# Patient Record
Sex: MALE | Race: OTHER | HISPANIC OR LATINO | Employment: UNEMPLOYED | URBAN - METROPOLITAN AREA
[De-identification: names, ages, dates, MRNs, and addresses within clinical notes are randomized per-mention and may not be internally consistent; named-entity substitution may affect disease eponyms.]

---

## 2023-08-27 ENCOUNTER — HOSPITAL ENCOUNTER (EMERGENCY)
Facility: HOSPITAL | Age: 43
Discharge: HOME | End: 2023-08-27
Attending: EMERGENCY MEDICINE
Payer: COMMERCIAL

## 2023-08-27 VITALS
WEIGHT: 172.18 LBS | DIASTOLIC BLOOD PRESSURE: 71 MMHG | HEIGHT: 66 IN | OXYGEN SATURATION: 99 % | TEMPERATURE: 97.7 F | SYSTOLIC BLOOD PRESSURE: 115 MMHG | HEART RATE: 68 BPM | RESPIRATION RATE: 18 BRPM | BODY MASS INDEX: 27.67 KG/M2

## 2023-08-27 DIAGNOSIS — K29.70 GASTRITIS: ICD-10-CM

## 2023-08-27 DIAGNOSIS — K80.20 CHOLELITHIASIS: Primary | ICD-10-CM

## 2023-08-27 LAB
ALBUMIN SERPL BCP-MCNC: 4.3 G/DL (ref 3.5–5)
ALP SERPL-CCNC: 69 U/L (ref 34–104)
ALT SERPL W P-5'-P-CCNC: 35 U/L (ref 7–52)
ANION GAP SERPL CALCULATED.3IONS-SCNC: 4 MMOL/L
AST SERPL W P-5'-P-CCNC: 31 U/L (ref 13–39)
BASOPHILS # BLD AUTO: 0.03 THOUSANDS/ÂΜL (ref 0–0.1)
BASOPHILS NFR BLD AUTO: 0 % (ref 0–1)
BILIRUB SERPL-MCNC: 0.44 MG/DL (ref 0.2–1)
BUN SERPL-MCNC: 24 MG/DL (ref 5–25)
CALCIUM SERPL-MCNC: 9 MG/DL (ref 8.4–10.2)
CHLORIDE SERPL-SCNC: 107 MMOL/L (ref 96–108)
CO2 SERPL-SCNC: 26 MMOL/L (ref 21–32)
CREAT SERPL-MCNC: 0.81 MG/DL (ref 0.6–1.3)
EOSINOPHIL # BLD AUTO: 0.12 THOUSAND/ÂΜL (ref 0–0.61)
EOSINOPHIL NFR BLD AUTO: 2 % (ref 0–6)
ERYTHROCYTE [DISTWIDTH] IN BLOOD BY AUTOMATED COUNT: 13 % (ref 11.6–15.1)
GFR SERPL CREATININE-BSD FRML MDRD: 108 ML/MIN/1.73SQ M
GLUCOSE SERPL-MCNC: 110 MG/DL (ref 65–140)
HCT VFR BLD AUTO: 42.4 % (ref 36.5–49.3)
HGB BLD-MCNC: 14.4 G/DL (ref 12–17)
IMM GRANULOCYTES # BLD AUTO: 0.01 THOUSAND/UL (ref 0–0.2)
IMM GRANULOCYTES NFR BLD AUTO: 0 % (ref 0–2)
LIPASE SERPL-CCNC: 21 U/L (ref 11–82)
LYMPHOCYTES # BLD AUTO: 1.26 THOUSANDS/ÂΜL (ref 0.6–4.47)
LYMPHOCYTES NFR BLD AUTO: 16 % (ref 14–44)
MCH RBC QN AUTO: 29.8 PG (ref 26.8–34.3)
MCHC RBC AUTO-ENTMCNC: 34 G/DL (ref 31.4–37.4)
MCV RBC AUTO: 88 FL (ref 82–98)
MONOCYTES # BLD AUTO: 0.72 THOUSAND/ÂΜL (ref 0.17–1.22)
MONOCYTES NFR BLD AUTO: 9 % (ref 4–12)
NEUTROPHILS # BLD AUTO: 5.89 THOUSANDS/ÂΜL (ref 1.85–7.62)
NEUTS SEG NFR BLD AUTO: 73 % (ref 43–75)
NRBC BLD AUTO-RTO: 0 /100 WBCS
PLATELET # BLD AUTO: 189 THOUSANDS/UL (ref 149–390)
PMV BLD AUTO: 8.7 FL (ref 8.9–12.7)
POTASSIUM SERPL-SCNC: 3.8 MMOL/L (ref 3.5–5.3)
PROT SERPL-MCNC: 7 G/DL (ref 6.4–8.4)
RBC # BLD AUTO: 4.84 MILLION/UL (ref 3.88–5.62)
SODIUM SERPL-SCNC: 137 MMOL/L (ref 135–147)
WBC # BLD AUTO: 8.03 THOUSAND/UL (ref 4.31–10.16)

## 2023-08-27 PROCEDURE — 99283 EMERGENCY DEPT VISIT LOW MDM: CPT

## 2023-08-27 PROCEDURE — 85025 COMPLETE CBC W/AUTO DIFF WBC: CPT | Performed by: EMERGENCY MEDICINE

## 2023-08-27 PROCEDURE — 99284 EMERGENCY DEPT VISIT MOD MDM: CPT | Performed by: EMERGENCY MEDICINE

## 2023-08-27 PROCEDURE — 80053 COMPREHEN METABOLIC PANEL: CPT | Performed by: EMERGENCY MEDICINE

## 2023-08-27 PROCEDURE — 86677 HELICOBACTER PYLORI ANTIBODY: CPT | Performed by: EMERGENCY MEDICINE

## 2023-08-27 PROCEDURE — 83690 ASSAY OF LIPASE: CPT | Performed by: EMERGENCY MEDICINE

## 2023-08-27 PROCEDURE — 36415 COLL VENOUS BLD VENIPUNCTURE: CPT | Performed by: EMERGENCY MEDICINE

## 2023-08-27 RX ORDER — OMEPRAZOLE 20 MG/1
20 CAPSULE, DELAYED RELEASE ORAL DAILY
Qty: 30 CAPSULE | Refills: 11 | Status: SHIPPED | OUTPATIENT
Start: 2023-08-27 | End: 2023-08-28

## 2023-08-27 RX ORDER — FAMOTIDINE 20 MG/1
40 TABLET, FILM COATED ORAL ONCE
Status: COMPLETED | OUTPATIENT
Start: 2023-08-27 | End: 2023-08-27

## 2023-08-27 RX ORDER — KETOROLAC TROMETHAMINE 10 MG/1
10 TABLET, FILM COATED ORAL ONCE
Status: COMPLETED | OUTPATIENT
Start: 2023-08-27 | End: 2023-08-27

## 2023-08-27 RX ORDER — KETOROLAC TROMETHAMINE 10 MG/1
10 TABLET, FILM COATED ORAL EVERY 6 HOURS PRN
Qty: 15 TABLET | Refills: 0 | Status: SHIPPED | OUTPATIENT
Start: 2023-08-27 | End: 2023-08-30

## 2023-08-27 RX ADMIN — FAMOTIDINE 40 MG: 20 TABLET ORAL at 17:16

## 2023-08-27 RX ADMIN — KETOROLAC TROMETHAMINE 10 MG: 10 TABLET, FILM COATED ORAL at 17:16

## 2023-08-27 NOTE — ED PROVIDER NOTES
History  Chief Complaint   Patient presents with   • Abdominal Pain     C/o upper abd pain, that gets worse after eating for about a month. This is a 38y.o.  year old male  Without significant past medical history,   No prior surgeries  Denies the use of cigarettes, alcohol, or drugs  That presents to the Emergency Department today with a chief complaint of epigastric abdominal discomfort. He has been going on and off. For the last few weeks. Usually aggravated with meals especially sauces. Currently no pain. No nausea no vomiting. He has never had any abdominal surgery. History obtained directly from the patient. History provided by:  Patient   used: No    Abdominal Pain  Pain location:  Epigastric  Associated symptoms: no chest pain, no chills, no cough, no dysuria, no fever, no hematuria, no nausea, no shortness of breath, no sore throat and no vomiting        None       History reviewed. No pertinent past medical history. History reviewed. No pertinent surgical history. History reviewed. No pertinent family history. I have reviewed and agree with the history as documented. E-Cigarette/Vaping     E-Cigarette/Vaping Substances     Social History     Tobacco Use   • Smoking status: Never   • Smokeless tobacco: Never   Substance Use Topics   • Alcohol use: Never   • Drug use: Never       Review of Systems   Constitutional: Negative for chills and fever. HENT: Negative for ear pain and sore throat. Eyes: Negative for pain and visual disturbance. Respiratory: Negative for cough and shortness of breath. Cardiovascular: Negative for chest pain and palpitations. Gastrointestinal: Positive for abdominal pain. Negative for nausea and vomiting. Genitourinary: Negative for dysuria and hematuria. Musculoskeletal: Negative for arthralgias and back pain. Skin: Negative for color change and rash. Neurological: Negative for seizures and syncope.    All other systems reviewed and are negative. Physical Exam  Physical Exam  Vitals and nursing note reviewed. Constitutional:       General: He is not in acute distress. Appearance: He is well-developed and normal weight. HENT:      Head: Normocephalic and atraumatic. Eyes:      Conjunctiva/sclera: Conjunctivae normal.   Cardiovascular:      Rate and Rhythm: Normal rate and regular rhythm. Heart sounds: Normal heart sounds. No murmur heard. Pulmonary:      Effort: Pulmonary effort is normal. No respiratory distress. Breath sounds: Normal breath sounds. Abdominal:      General: Abdomen is flat. Bowel sounds are normal. There is no distension. There are no signs of injury. Palpations: Abdomen is soft. Tenderness: There is abdominal tenderness in the epigastric area. There is no guarding or rebound. Hernia: No hernia is present. Comments: ED bedside ultrasound was done by me. Gallbladder shows multiple stones. Note no pericholecystic fluid. He did have sonographic Smith's on palpation of the right upper quadrant. Musculoskeletal:         General: No swelling. Cervical back: Neck supple. Skin:     General: Skin is warm and dry. Capillary Refill: Capillary refill takes less than 2 seconds. Neurological:      General: No focal deficit present. Mental Status: He is alert and oriented to person, place, and time.    Psychiatric:         Mood and Affect: Mood normal.         Behavior: Behavior normal.         Vital Signs  ED Triage Vitals [08/27/23 1557]   Temperature Pulse Respirations Blood Pressure SpO2   97.7 °F (36.5 °C) 68 18 115/71 99 %      Temp Source Heart Rate Source Patient Position - Orthostatic VS BP Location FiO2 (%)   Tympanic Monitor Sitting Left arm --      Pain Score       --           Vitals:    08/27/23 1557   BP: 115/71   Pulse: 68   Patient Position - Orthostatic VS: Sitting         Visual Acuity      ED Medications  Medications - No data to display    Diagnostic Studies  Results Reviewed     Procedure Component Value Units Date/Time    Lipase [[id number]]  (Normal) Collected: 08/27/23 1626    Lab Status: Final result Specimen: Blood from Arm, Right Updated: 08/27/23 1703     Lipase 21 u/L     CMP [[id number]] Collected: 08/27/23 1626    Lab Status: Final result Specimen: Blood from Arm, Right Updated: 08/27/23 1703     Sodium 137 mmol/L      Potassium 3.8 mmol/L      Chloride 107 mmol/L      CO2 26 mmol/L      ANION GAP 4 mmol/L      BUN 24 mg/dL      Creatinine 0.81 mg/dL      Glucose 110 mg/dL      Calcium 9.0 mg/dL      AST 31 U/L      ALT 35 U/L      Alkaline Phosphatase 69 U/L      Total Protein 7.0 g/dL      Albumin 4.3 g/dL      Total Bilirubin 0.44 mg/dL      eGFR 108 ml/min/1.73sq m     Narrative:      [employer] guidelines for Chronic Kidney Disease (CKD):   •  Stage 1 with normal or high GFR (GFR > 90 mL/min/1.73 square meters)  •  Stage 2 Mild CKD (GFR = 60-89 mL/min/1.73 square meters)  •  Stage 3A Moderate CKD (GFR = 45-59 mL/min/1.73 square meters)  •  Stage 3B Moderate CKD (GFR = 30-44 mL/min/1.73 square meters)  •  Stage 4 Severe CKD (GFR = 15-29 mL/min/1.73 square meters)  •  Stage 5 End Stage CKD (GFR <15 mL/min/1.73 square meters)  Note: GFR calculation is accurate only with a steady state creatinine    CBC and differential [[id number]]  (Abnormal) Collected: 08/27/23 1626    Lab Status: Final result Specimen: Blood from Arm, Right Updated: 08/27/23 1634     WBC 8.03 Thousand/uL      RBC 4.84 Million/uL      Hemoglobin 14.4 g/dL      Hematocrit 42.4 %      MCV 88 fL      MCH 29.8 pg      MCHC 34.0 g/dL      RDW 13.0 %      MPV 8.7 fL      Platelets 675 Thousands/uL      nRBC 0 /100 WBCs      Neutrophils Relative 73 %      Immat GRANS % 0 %      Lymphocytes Relative 16 %      Monocytes Relative 9 %      Eosinophils Relative 2 %      Basophils Relative 0 %      Neutrophils Absolute 5.89 Thousands/µL      Immature Grans Absolute 0.01 Thousand/uL      Lymphocytes Absolute 1.26 Thousands/µL      Monocytes Absolute 0.72 Thousand/µL      Eosinophils Absolute 0.12 Thousand/µL      Basophils Absolute 0.03 Thousands/µL     Helicobacter Pylori,IgM [[id number]] Collected: 08/27/23 1626    Lab Status: In process Specimen: Arm, Right Updated: 08/27/23 1630                  right upper quadrant    (Results Pending)              Procedures  Procedures         ED Course  ED Course as of 08/27/23 1707   Sun Aug 27, 2023   1648 WBC: 8.03   1648 Hemoglobin: 14.4   1648 HCT: 42.4   1704 Lipase: 21   1704 Sodium: 137   1704 Potassium: 3.8   1704 BUN: 24   1704 Creatinine: 0.81                                             Medical Decision Making  I have considered a broad diagnosis for abdominal pain that includes: Appendicitis, diverticulitis, colitis, cholecystitis, biliary colic, volvulus, small bowel obstruction, ileus, UTI, and other abdominal pathology. Given the patient's physical exam and work-up today, there is low although not zero suspicion for these diagnoses. Patient was advised and given appropriate return precautions, including continued or new fever, persistent vomiting, worsening abdominal pain, or any other concerning signs or symptoms especially if there are new. Laboratory results revealed a   WBC   Normal  HH   Normal  PLT   Normal  Kidney Function  Normal  Electrolytes  Normal         Amount and/or Complexity of Data Reviewed  Labs: ordered. Decision-making details documented in ED Course. Risk  OTC drugs.           Disposition  Final diagnoses:   Cholelithiasis   Gastritis     Time reflects when diagnosis was documented in both MDM as applicable and the Disposition within this note     Time User Action Codes Description Comment    8/27/2023  5:05 PM Kp Hinton [K80.20] Cholelithiasis     8/27/2023  5:05 PM Kp Hinton [K29.70] Gastritis       ED Disposition     ED Disposition   Discharge    Condition Stable    Date/Time   Sun Aug 27, 2023  5:05 PM    Comment   Rebecca Shape discharge to home/self care. Follow-up Information     Follow up With Specialties Details Why Contact Info Additional Information     [facility] General Surgery In 1 week  [address]  [address] [address]  [address] [place] [address], [address], [place] [address] [phone]          Patient's Medications   Discharge Prescriptions    KETOROLAC (TORADOL) 10 MG TABLET    Take 1 tablet (10 mg total) by mouth every 6 (six) hours as needed for moderate pain       Start Date: 8/27/2023 End Date: --       Order Dose: 10 mg       Quantity: 15 tablet    Refills: 0    OMEPRAZOLE (PRILOSEC) 20 MG DELAYED RELEASE CAPSULE    Take 1 capsule (20 mg total) by mouth daily       Start Date: 8/27/2023 End Date: --       Order Dose: 20 mg       Quantity: 30 capsule    Refills: 11       Outpatient Discharge Orders    right upper quadrant   Standing Status: Future Standing Exp.  Date: 08/27/27       PDMP Review     None          ED Provider  Electronically Signed by           Cindy Greenwood MD  08/27/23 9189

## 2023-08-27 NOTE — DISCHARGE INSTRUCTIONS
A  personal message from Dr. Tangela Feliciano,  Thank you so much for allowing me to care for you today. I pride myself in the care and attention I give all my patients. I hope you were a witness to this tonight. If for any reason your condition does not improve or worsens, or you have a question that was not answered during your visit you can feel free to text me on my personal phone #  # [phone]. I will answer to your message and continue your care past your emergency room visit. Please understand that although you are being discharged because your condition has been deemed stable and able to be managed on an outpatient setting. However your condition may worsen as part of the natural progression of the illness/condition, if this occurs please come back to the emergency department for a repeat evaluation.

## 2023-08-28 ENCOUNTER — APPOINTMENT (EMERGENCY)
Dept: CT IMAGING | Facility: HOSPITAL | Age: 43
End: 2023-08-28
Payer: COMMERCIAL

## 2023-08-28 ENCOUNTER — HOSPITAL ENCOUNTER (EMERGENCY)
Facility: HOSPITAL | Age: 43
Discharge: HOME | End: 2023-08-28
Attending: EMERGENCY MEDICINE | Admitting: EMERGENCY MEDICINE
Payer: COMMERCIAL

## 2023-08-28 VITALS
HEART RATE: 73 BPM | OXYGEN SATURATION: 100 % | BODY MASS INDEX: 28.61 KG/M2 | DIASTOLIC BLOOD PRESSURE: 75 MMHG | TEMPERATURE: 98 F | WEIGHT: 178 LBS | HEIGHT: 66 IN | RESPIRATION RATE: 18 BRPM | SYSTOLIC BLOOD PRESSURE: 126 MMHG

## 2023-08-28 DIAGNOSIS — R10.9 ABDOMINAL PAIN: Primary | ICD-10-CM

## 2023-08-28 LAB
ALBUMIN SERPL BCP-MCNC: 4.2 G/DL (ref 3.5–5)
ALP SERPL-CCNC: 66 U/L (ref 34–104)
ALT SERPL W P-5'-P-CCNC: 32 U/L (ref 7–52)
ANION GAP SERPL CALCULATED.3IONS-SCNC: 6 MMOL/L
AST SERPL W P-5'-P-CCNC: 27 U/L (ref 13–39)
BACTERIA UR QL AUTO: ABNORMAL /HPF
BASOPHILS # BLD AUTO: 0.03 THOUSANDS/ÂΜL (ref 0–0.1)
BASOPHILS NFR BLD AUTO: 0 % (ref 0–1)
BILIRUB SERPL-MCNC: 0.53 MG/DL (ref 0.2–1)
BILIRUB UR QL STRIP: NEGATIVE
BUN SERPL-MCNC: 24 MG/DL (ref 5–25)
CALCIUM SERPL-MCNC: 9.1 MG/DL (ref 8.4–10.2)
CARDIAC TROPONIN I PNL SERPL HS: <2 NG/L
CHLORIDE SERPL-SCNC: 107 MMOL/L (ref 96–108)
CLARITY UR: CLEAR
CO2 SERPL-SCNC: 24 MMOL/L (ref 21–32)
COLOR UR: YELLOW
CREAT SERPL-MCNC: 0.78 MG/DL (ref 0.6–1.3)
EOSINOPHIL # BLD AUTO: 0.18 THOUSAND/ÂΜL (ref 0–0.61)
EOSINOPHIL NFR BLD AUTO: 2 % (ref 0–6)
ERYTHROCYTE [DISTWIDTH] IN BLOOD BY AUTOMATED COUNT: 13 % (ref 11.6–15.1)
GFR SERPL CREATININE-BSD FRML MDRD: 109 ML/MIN/1.73SQ M
GLUCOSE SERPL-MCNC: 108 MG/DL (ref 65–140)
GLUCOSE UR STRIP-MCNC: NEGATIVE MG/DL
HCT VFR BLD AUTO: 43 % (ref 36.5–49.3)
HGB BLD-MCNC: 14.7 G/DL (ref 12–17)
HGB UR QL STRIP.AUTO: NEGATIVE
IMM GRANULOCYTES # BLD AUTO: 0.02 THOUSAND/UL (ref 0–0.2)
IMM GRANULOCYTES NFR BLD AUTO: 0 % (ref 0–2)
KETONES UR STRIP-MCNC: NEGATIVE MG/DL
LEUKOCYTE ESTERASE UR QL STRIP: NEGATIVE
LIPASE SERPL-CCNC: 28 U/L (ref 11–82)
LYMPHOCYTES # BLD AUTO: 1.42 THOUSANDS/ÂΜL (ref 0.6–4.47)
LYMPHOCYTES NFR BLD AUTO: 14 % (ref 14–44)
MCH RBC QN AUTO: 29.9 PG (ref 26.8–34.3)
MCHC RBC AUTO-ENTMCNC: 34.2 G/DL (ref 31.4–37.4)
MCV RBC AUTO: 88 FL (ref 82–98)
MONOCYTES # BLD AUTO: 1.02 THOUSAND/ÂΜL (ref 0.17–1.22)
MONOCYTES NFR BLD AUTO: 10 % (ref 4–12)
MUCOUS THREADS UR QL AUTO: ABNORMAL
NEUTROPHILS # BLD AUTO: 7.46 THOUSANDS/ÂΜL (ref 1.85–7.62)
NEUTS SEG NFR BLD AUTO: 74 % (ref 43–75)
NITRITE UR QL STRIP: NEGATIVE
NON-SQ EPI CELLS URNS QL MICRO: ABNORMAL /HPF
NRBC BLD AUTO-RTO: 0 /100 WBCS
PH UR STRIP.AUTO: 5.5 [PH]
PLATELET # BLD AUTO: 197 THOUSANDS/UL (ref 149–390)
PMV BLD AUTO: 8.7 FL (ref 8.9–12.7)
POTASSIUM SERPL-SCNC: 3.7 MMOL/L (ref 3.5–5.3)
PROT SERPL-MCNC: 6.9 G/DL (ref 6.4–8.4)
PROT UR STRIP-MCNC: ABNORMAL MG/DL
RBC # BLD AUTO: 4.91 MILLION/UL (ref 3.88–5.62)
RBC #/AREA URNS AUTO: ABNORMAL /HPF
SODIUM SERPL-SCNC: 137 MMOL/L (ref 135–147)
SP GR UR STRIP.AUTO: 1.03 (ref 1–1.03)
UROBILINOGEN UR STRIP-ACNC: <2 MG/DL
WBC # BLD AUTO: 10.13 THOUSAND/UL (ref 4.31–10.16)
WBC #/AREA URNS AUTO: ABNORMAL /HPF

## 2023-08-28 PROCEDURE — 36415 COLL VENOUS BLD VENIPUNCTURE: CPT | Performed by: EMERGENCY MEDICINE

## 2023-08-28 PROCEDURE — 85025 COMPLETE CBC W/AUTO DIFF WBC: CPT | Performed by: EMERGENCY MEDICINE

## 2023-08-28 PROCEDURE — 93005 ELECTROCARDIOGRAM TRACING: CPT

## 2023-08-28 PROCEDURE — 96374 THER/PROPH/DIAG INJ IV PUSH: CPT

## 2023-08-28 PROCEDURE — 81001 URINALYSIS AUTO W/SCOPE: CPT | Performed by: EMERGENCY MEDICINE

## 2023-08-28 PROCEDURE — 74177 CT ABD & PELVIS W/CONTRAST: CPT

## 2023-08-28 PROCEDURE — 80053 COMPREHEN METABOLIC PANEL: CPT | Performed by: EMERGENCY MEDICINE

## 2023-08-28 PROCEDURE — 83690 ASSAY OF LIPASE: CPT | Performed by: EMERGENCY MEDICINE

## 2023-08-28 PROCEDURE — 99284 EMERGENCY DEPT VISIT MOD MDM: CPT

## 2023-08-28 PROCEDURE — 99285 EMERGENCY DEPT VISIT HI MDM: CPT | Performed by: EMERGENCY MEDICINE

## 2023-08-28 PROCEDURE — 84484 ASSAY OF TROPONIN QUANT: CPT | Performed by: EMERGENCY MEDICINE

## 2023-08-28 RX ORDER — PANTOPRAZOLE SODIUM 40 MG/1
40 TABLET, DELAYED RELEASE ORAL DAILY
Qty: 14 TABLET | Refills: 0 | Status: SHIPPED | OUTPATIENT
Start: 2023-08-28 | End: 2023-09-07

## 2023-08-28 RX ORDER — KETOROLAC TROMETHAMINE 30 MG/ML
15 INJECTION, SOLUTION INTRAMUSCULAR; INTRAVENOUS ONCE
Status: COMPLETED | OUTPATIENT
Start: 2023-08-28 | End: 2023-08-28

## 2023-08-28 RX ORDER — SUCRALFATE 1 G/1
1 TABLET ORAL 4 TIMES DAILY
Qty: 56 TABLET | Refills: 0 | Status: SHIPPED | OUTPATIENT
Start: 2023-08-28 | End: 2023-08-30

## 2023-08-28 RX ADMIN — IOHEXOL 100 ML: 350 INJECTION, SOLUTION INTRAVENOUS at 03:18

## 2023-08-28 RX ADMIN — KETOROLAC TROMETHAMINE 15 MG: 30 INJECTION, SOLUTION INTRAMUSCULAR at 02:56

## 2023-08-28 NOTE — ED PROVIDER NOTES
History  Chief Complaint   Patient presents with   • Abdominal Pain     Pt arrived ambulatory with c/o abdominal pain. Pt seen here yesterday for same, states he was not able to  the meds in the pharmacy     38 y.o. male presents with a chief complaint of persistent abdominal pain after evaluation in the emergency department yesterday the diagnosed cholelithiasis. Patient affirms 3 days of epigastric abdominal pain with occasional radiation to his left flank. Patient describes pain that came on gradually, has been waxing and waning throughout the day but worsening with supine positioning at night and continues in the emergency room. Patient states sitting forward somewhat improves his pain though supine positioning worsens it. Patient notes an episode approximately 1 month ago that was not as severe that resolved spontaneously but otherwise denies prior episodes. Patient denies any nausea or vomiting. Patient affirms diarrhea yesterday but none today. Patient notes last bowel movement was yesterday. Patient denies urinary symptoms. Patient denies testicular pain or penile discharge. Patient denies contacts with similar symptoms. Patient denies any recent use of antibiotics, international travel, or trauma. Patient notes history of no prior surgery. Focused Objective Exam:  Abdomen:  Inspection of an abdomen without previous abdominal surgical incisions noted without erythema, rashes or ecchymosis noted. No abdominal pulsations noted. Normal bowel sounds with no bruit auscultated. Soft abdomen. Palpitation noted tenderness in the epigastrium and lesser in the lower quadrants; tenderness not directly over McBurney's point. No masses or pulsatile aorta noted on examination. No rebound or guarding noted on examination, non-peritoneal exam.    Back:  No rash or signs of herpes zoster. No CVA tenderness noted.    Skin:  No ecchymosis of the umbilicus (negative De Young's sign) or flank (negative Abdi Rhodes's sign). Warm and dry. Medical Decision Making    Abdominal pain with a broad differential.  Will establish IV access and make patient NPO considering possibility of surgical intervention required. Will initiate symptomatic management. As patient has history of risk factors for ACS, will obtain EKG and troponin to evaluate for potential referred pain. Review of the medical record including prior ED note. Differential is broad and includes significant likelihood of intra-abdominal pathology, including concerns for potential epigastric or biliary pathology. Patient does have some tenderness in the right lower quadrant though it is not directly over McBurney's point, lesser suspicion for appendicitis. Considering associated diarrhea yesterday, potentially infectious or inflammatory processes. Patient did note improvement after treatment yesterday with ketorolac so we will repeat this while obtaining further evaluation. Will obtain CBC to evaluate for anemia and leukocytosis. Will obtain CMP to evaluate electrolytes, renal, biliary, and hepatic function. Will obtain lipase to evaluate for potential pancreatitis. Will obtain urinalysis to evaluate for possible urinary infectious sources and ketones to evaluate potential hydration state. Based on patient's age, history, physical exam and presenting complaint, will obtain contrast enhanced CT imaging of patient's abdomen and pelvis to further evaluate for possible intraabdominal pathology. Prior to Admission Medications   Prescriptions Last Dose Informant Patient Reported? Taking?   ketorolac (TORADOL) 10 mg tablet   No No   Sig: Take 1 tablet (10 mg total) by mouth every 6 (six) hours as needed for moderate pain      Facility-Administered Medications: None       History reviewed. No pertinent past medical history. History reviewed. No pertinent surgical history. History reviewed.  No pertinent family history. I have reviewed and agree with the history as documented.     E-Cigarette/Vaping     E-Cigarette/Vaping Substances     Social History     Tobacco Use   • Smoking status: Never   • Smokeless tobacco: Never   Substance Use Topics   • Alcohol use: Never   • Drug use: Never       Review of Systems    Physical Exam  Physical Exam    Vital Signs  ED Triage Vitals [08/28/23 0218]   Temperature Pulse Respirations Blood Pressure SpO2   98 °F (36.7 °C) 73 18 126/75 100 %      Temp Source Heart Rate Source Patient Position - Orthostatic VS BP Location FiO2 (%)   Tympanic Monitor Sitting Left arm --      Pain Score       --           Vitals:    08/28/23 0218   BP: 126/75   Pulse: 73   Patient Position - Orthostatic VS: Sitting         Visual Acuity      ED Medications  Medications   ketorolac (TORADOL) injection 15 mg (15 mg Intravenous Given 8/28/23 0256)   iohexol (OMNIPAQUE) 350 MG/ML injection (SINGLE-DOSE) 100 mL (100 mL Intravenous Given 8/28/23 0318)       Diagnostic Studies  Results Reviewed     Procedure Component Value Units Date/Time    HS Troponin I 4hr [[id number]]     Lab Status: No result Specimen: Blood     HS Troponin 0hr (reflex protocol) [[id number]]  (Normal) Collected: 08/28/23 0303    Lab Status: Final result Specimen: Blood from Arm, Left Updated: 08/28/23 0333     hs TnI 0hr <2 ng/L     HS Troponin I 2hr [[id number]]     Lab Status: No result Specimen: Blood     Urine Microscopic [[id number]]  (Abnormal) Collected: 08/28/23 0303    Lab Status: Final result Specimen: Urine, Clean Catch Updated: 08/28/23 0312     RBC, UA None Seen /hpf      WBC, UA 1-2 /hpf      Epithelial Cells Occasional /hpf      Bacteria, UA None Seen /hpf      MUCUS THREADS Occasional    UA w Reflex to Microscopic w Reflex to Culture [[id number]]  (Abnormal) Collected: 08/28/23 0303    Lab Status: Final result Specimen: Urine, Clean Catch Updated: 08/28/23 0311     Color, UA Yellow     Clarity, UA Clear     Specific Gravity, UA 1.035     pH, UA 5.5     Leukocytes, UA Negative     Nitrite, UA Negative     Protein, UA Trace mg/dl      Glucose, UA Negative mg/dl      Ketones, UA Negative mg/dl      Urobilinogen, UA <2.0 mg/dl      Bilirubin, UA Negative     Occult Blood, UA Negative    CMP [[id number]] Collected: 08/28/23 0234    Lab Status: Final result Specimen: Blood from Arm, Left Updated: 08/28/23 0258     Sodium 137 mmol/L      Potassium 3.7 mmol/L      Chloride 107 mmol/L      CO2 24 mmol/L      ANION GAP 6 mmol/L      BUN 24 mg/dL      Creatinine 0.78 mg/dL      Glucose 108 mg/dL      Calcium 9.1 mg/dL      AST 27 U/L      ALT 32 U/L      Alkaline Phosphatase 66 U/L      Total Protein 6.9 g/dL      Albumin 4.2 g/dL      Total Bilirubin 0.53 mg/dL      eGFR 109 ml/min/1.73sq m     Narrative:      [facility] guidelines for Chronic Kidney Disease (CKD):   •  Stage 1 with normal or high GFR (GFR > 90 mL/min/1.73 square meters)  •  Stage 2 Mild CKD (GFR = 60-89 mL/min/1.73 square meters)  •  Stage 3A Moderate CKD (GFR = 45-59 mL/min/1.73 square meters)  •  Stage 3B Moderate CKD (GFR = 30-44 mL/min/1.73 square meters)  •  Stage 4 Severe CKD (GFR = 15-29 mL/min/1.73 square meters)  •  Stage 5 End Stage CKD (GFR <15 mL/min/1.73 square meters)  Note: GFR calculation is accurate only with a steady state creatinine    Lipase [[id number]]  (Normal) Collected: 08/28/23 0234    Lab Status: Final result Specimen: Blood from Arm, Left Updated: 08/28/23 0258     Lipase 28 u/L     CBC and differential [[id number]]  (Abnormal) Collected: 08/28/23 0234    Lab Status: Final result Specimen: Blood from Arm, Left Updated: 08/28/23 0240     WBC 10.13 Thousand/uL      RBC 4.91 Million/uL      Hemoglobin 14.7 g/dL      Hematocrit 43.0 %      MCV 88 fL      MCH 29.9 pg      MCHC 34.2 g/dL      RDW 13.0 %      MPV 8.7 fL      Platelets 312 Thousands/uL      nRBC 0 /100 WBCs      Neutrophils Relative 74 %      Immat GRANS % 0 % Lymphocytes Relative 14 %      Monocytes Relative 10 %      Eosinophils Relative 2 %      Basophils Relative 0 %      Neutrophils Absolute 7.46 Thousands/µL      Immature Grans Absolute 0.02 Thousand/uL      Lymphocytes Absolute 1.42 Thousands/µL      Monocytes Absolute 1.02 Thousand/µL      Eosinophils Absolute 0.18 Thousand/µL      Basophils Absolute 0.03 Thousands/µL                  CT abdomen pelvis with contrast   Final Result by Teresa Trinidad MD (08/28 9414)      No evidence of acute intra-abdominal or pelvic pathology            Workstation performed: [id number]                    Procedures  Procedures         ED Course  ED Course as of 08/28/23 0417   Mon Aug 28, 2023   0301 KG demonstrates normal sinus rhythm with no acute ST segment changes, nonspecific findings without prior to compare. Intervals are normal including QTc of 388.   0359 Patient's laboratory evaluation did not demonstrate any clear findings that would explain the etiology of his symptoms. Discussed diagnostic concerning for potential etiologies and suggested follow-up with gastroenterology for review of possible gastric or other pathologies. Patient notes improvement in symptoms following treatment. Will place patient on course of pantoprazole and Carafate and have follow closely with gastroenterology. Discussed and emphasized diagnostic concern and the need for continued follow-up and return precautions in detail. SBIRT 22yo+    Flowsheet Row Most Recent Value   Initial Alcohol Screen: US AUDIT-C     1. How often do you have a drink containing alcohol? 0 Filed at: 08/28/2023 0221   2. How many drinks containing alcohol do you have on a typical day you are drinking? 0 Filed at: 08/28/2023 0221   3a. Male UNDER 65: How often do you have five or more drinks on one occasion? 0 Filed at: 08/28/2023 0221   Audit-C Score 0 Filed at: 08/28/2023 0221   GAIL: How many times in the past year have you. .. Used an illegal drug or used a prescription medication for non-medical reasons? Never Filed at: 08/28/2023 0221                    MDM    Disposition  Final diagnoses:   Abdominal pain     Time reflects when diagnosis was documented in both MDM as applicable and the Disposition within this note     Time User Action Codes Description Comment    8/28/2023  4:00 AM Rocky Nguyen Add [R10.9] Abdominal pain     8/28/2023  4:00 AM Rocky Nguyen Modify [R10.9] Abdominal pain       ED Disposition     ED Disposition   Discharge    Condition   Stable    Date/Time   Mon Aug 28, 2023  4:00 AM    Comment   Lina Busby discharge to home/self care. Follow-up Information     Follow up With Specialties Details Why Contact Info Additional [address] [facility] Schedule an appointment as soon as possible for a visit  Schedule follow-up for continued management of your abdominal pain with gastroenterology.  [address] [facility] [place], [address] [facility], [facility], [place], [place], [address]     [address] [place] [address] Emergency Department Emergency Medicine Go to  If symptoms worsen [address] [facility] [id number] [address] [facility] Emergency Department, [place], [place], [address]          Discharge Medication List as of 8/28/2023  4:01 AM      START taking these medications    Details   pantoprazole (PROTONIX) 40 mg tablet Take 1 tablet (40 mg total) by mouth daily for 14 days, Starting Mon 8/28/2023, Until Mon 9/11/2023, Print      sucralfate (CARAFATE) 1 g tablet Take 1 tablet (1 g total) by mouth 4 (four) times a day for 14 days, Starting Mon 8/28/2023, Until Mon 9/11/2023, Print         CONTINUE these medications which have NOT CHANGED    Details   ketorolac (TORADOL) 10 mg tablet Take 1 tablet (10 mg total) by mouth every 6 (six) hours as needed for moderate pain, Starting Sun 8/27/2023, Print             No discharge procedures on file.     PDMP Review     None          ED Provider  Electronically Signed by           Adilene Stover MD  08/28/23 7317

## 2023-08-29 ENCOUNTER — HOSPITAL ENCOUNTER (OUTPATIENT)
Facility: HOSPITAL | Age: 43
Setting detail: OBSERVATION
Discharge: HOME | End: 2023-08-30
Attending: EMERGENCY MEDICINE | Admitting: FAMILY MEDICINE
Payer: COMMERCIAL

## 2023-08-29 ENCOUNTER — APPOINTMENT (EMERGENCY)
Dept: ULTRASOUND IMAGING | Facility: HOSPITAL | Age: 43
End: 2023-08-29
Payer: COMMERCIAL

## 2023-08-29 ENCOUNTER — APPOINTMENT (EMERGENCY)
Dept: CT IMAGING | Facility: HOSPITAL | Age: 43
End: 2023-08-29
Payer: COMMERCIAL

## 2023-08-29 DIAGNOSIS — R10.13 EPIGASTRIC PAIN: Primary | ICD-10-CM

## 2023-08-29 DIAGNOSIS — R93.5 ABNORMAL CT OF THE ABDOMEN: ICD-10-CM

## 2023-08-29 LAB
ALBUMIN SERPL BCP-MCNC: 4.1 G/DL (ref 3.5–5)
ALP SERPL-CCNC: 70 U/L (ref 34–104)
ALT SERPL W P-5'-P-CCNC: 29 U/L (ref 7–52)
ANION GAP SERPL CALCULATED.3IONS-SCNC: 4 MMOL/L
AST SERPL W P-5'-P-CCNC: 24 U/L (ref 13–39)
ATRIAL RATE: 61 BPM
ATRIAL RATE: 62 BPM
BASOPHILS # BLD AUTO: 0.03 THOUSANDS/ÂΜL (ref 0–0.1)
BASOPHILS NFR BLD AUTO: 0 % (ref 0–1)
BILIRUB SERPL-MCNC: 0.59 MG/DL (ref 0.2–1)
BUN SERPL-MCNC: 18 MG/DL (ref 5–25)
CALCIUM SERPL-MCNC: 9.2 MG/DL (ref 8.4–10.2)
CHLORIDE SERPL-SCNC: 105 MMOL/L (ref 96–108)
CO2 SERPL-SCNC: 28 MMOL/L (ref 21–32)
CREAT SERPL-MCNC: 0.87 MG/DL (ref 0.6–1.3)
CRP SERPL QL: 49.6 MG/L
EOSINOPHIL # BLD AUTO: 0.17 THOUSAND/ÂΜL (ref 0–0.61)
EOSINOPHIL NFR BLD AUTO: 2 % (ref 0–6)
ERYTHROCYTE [DISTWIDTH] IN BLOOD BY AUTOMATED COUNT: 13.1 % (ref 11.6–15.1)
GFR SERPL CREATININE-BSD FRML MDRD: 104 ML/MIN/1.73SQ M
GLUCOSE SERPL-MCNC: 100 MG/DL (ref 65–140)
H PYLORI IGM SER-ACNC: <9 UNITS (ref 0–8.9)
HCT VFR BLD AUTO: 42.4 % (ref 36.5–49.3)
HGB BLD-MCNC: 14.4 G/DL (ref 12–17)
IMM GRANULOCYTES # BLD AUTO: 0.01 THOUSAND/UL (ref 0–0.2)
IMM GRANULOCYTES NFR BLD AUTO: 0 % (ref 0–2)
LACTATE SERPL-SCNC: 0.7 MMOL/L (ref 0.5–2)
LIPASE SERPL-CCNC: 25 U/L (ref 11–82)
LYMPHOCYTES # BLD AUTO: 1.41 THOUSANDS/ÂΜL (ref 0.6–4.47)
LYMPHOCYTES NFR BLD AUTO: 17 % (ref 14–44)
MCH RBC QN AUTO: 29.9 PG (ref 26.8–34.3)
MCHC RBC AUTO-ENTMCNC: 34 G/DL (ref 31.4–37.4)
MCV RBC AUTO: 88 FL (ref 82–98)
MONOCYTES # BLD AUTO: 1 THOUSAND/ÂΜL (ref 0.17–1.22)
MONOCYTES NFR BLD AUTO: 12 % (ref 4–12)
NEUTROPHILS # BLD AUTO: 5.77 THOUSANDS/ÂΜL (ref 1.85–7.62)
NEUTS SEG NFR BLD AUTO: 69 % (ref 43–75)
NRBC BLD AUTO-RTO: 0 /100 WBCS
P AXIS: 41 DEGREES
P AXIS: 42 DEGREES
PLATELET # BLD AUTO: 190 THOUSANDS/UL (ref 149–390)
PMV BLD AUTO: 8.8 FL (ref 8.9–12.7)
POTASSIUM SERPL-SCNC: 4 MMOL/L (ref 3.5–5.3)
PR INTERVAL: 156 MS
PR INTERVAL: 164 MS
PROT SERPL-MCNC: 6.9 G/DL (ref 6.4–8.4)
QRS AXIS: 48 DEGREES
QRS AXIS: 61 DEGREES
QRSD INTERVAL: 100 MS
QRSD INTERVAL: 102 MS
QT INTERVAL: 386 MS
QT INTERVAL: 414 MS
QTC INTERVAL: 388 MS
QTC INTERVAL: 420 MS
RBC # BLD AUTO: 4.82 MILLION/UL (ref 3.88–5.62)
SODIUM SERPL-SCNC: 137 MMOL/L (ref 135–147)
T WAVE AXIS: 30 DEGREES
T WAVE AXIS: 51 DEGREES
VENTRICULAR RATE: 61 BPM
VENTRICULAR RATE: 62 BPM
WBC # BLD AUTO: 8.39 THOUSAND/UL (ref 4.31–10.16)

## 2023-08-29 PROCEDURE — 85025 COMPLETE CBC W/AUTO DIFF WBC: CPT

## 2023-08-29 PROCEDURE — 83605 ASSAY OF LACTIC ACID: CPT | Performed by: PHYSICIAN ASSISTANT

## 2023-08-29 PROCEDURE — 83993 ASSAY FOR CALPROTECTIN FECAL: CPT | Performed by: PHYSICIAN ASSISTANT

## 2023-08-29 PROCEDURE — 96374 THER/PROPH/DIAG INJ IV PUSH: CPT

## 2023-08-29 PROCEDURE — 86140 C-REACTIVE PROTEIN: CPT | Performed by: PHYSICIAN ASSISTANT

## 2023-08-29 PROCEDURE — 93005 ELECTROCARDIOGRAM TRACING: CPT

## 2023-08-29 PROCEDURE — 96361 HYDRATE IV INFUSION ADD-ON: CPT

## 2023-08-29 PROCEDURE — 99223 1ST HOSP IP/OBS HIGH 75: CPT | Performed by: FAMILY MEDICINE

## 2023-08-29 PROCEDURE — G1004 CDSM NDSC: HCPCS

## 2023-08-29 PROCEDURE — C9113 INJ PANTOPRAZOLE SODIUM, VIA: HCPCS | Performed by: FAMILY MEDICINE

## 2023-08-29 PROCEDURE — 76705 ECHO EXAM OF ABDOMEN: CPT

## 2023-08-29 PROCEDURE — 99284 EMERGENCY DEPT VISIT MOD MDM: CPT

## 2023-08-29 PROCEDURE — 96375 TX/PRO/DX INJ NEW DRUG ADDON: CPT

## 2023-08-29 PROCEDURE — 36415 COLL VENOUS BLD VENIPUNCTURE: CPT

## 2023-08-29 PROCEDURE — 80053 COMPREHEN METABOLIC PANEL: CPT

## 2023-08-29 PROCEDURE — 83690 ASSAY OF LIPASE: CPT

## 2023-08-29 PROCEDURE — 99214 OFFICE O/P EST MOD 30 MIN: CPT | Performed by: INTERNAL MEDICINE

## 2023-08-29 PROCEDURE — 93010 ELECTROCARDIOGRAM REPORT: CPT | Performed by: INTERNAL MEDICINE

## 2023-08-29 PROCEDURE — 99285 EMERGENCY DEPT VISIT HI MDM: CPT

## 2023-08-29 PROCEDURE — 74177 CT ABD & PELVIS W/CONTRAST: CPT

## 2023-08-29 RX ORDER — FAMOTIDINE 20 MG/1
20 TABLET, FILM COATED ORAL 2 TIMES DAILY
Qty: 30 TABLET | Refills: 0 | Status: SHIPPED | OUTPATIENT
Start: 2023-08-29

## 2023-08-29 RX ORDER — FAMOTIDINE 10 MG/ML
20 INJECTION, SOLUTION INTRAVENOUS ONCE
Status: COMPLETED | OUTPATIENT
Start: 2023-08-29 | End: 2023-08-29

## 2023-08-29 RX ORDER — SODIUM CHLORIDE, SODIUM GLUCONATE, SODIUM ACETATE, POTASSIUM CHLORIDE, MAGNESIUM CHLORIDE, SODIUM PHOSPHATE, DIBASIC, AND POTASSIUM PHOSPHATE .53; .5; .37; .037; .03; .012; .00082 G/100ML; G/100ML; G/100ML; G/100ML; G/100ML; G/100ML; G/100ML
100 INJECTION, SOLUTION INTRAVENOUS CONTINUOUS
Status: DISCONTINUED | OUTPATIENT
Start: 2023-08-29 | End: 2023-08-30 | Stop reason: HOSPADM

## 2023-08-29 RX ORDER — DICYCLOMINE HYDROCHLORIDE 10 MG/1
10 CAPSULE ORAL
Status: DISCONTINUED | OUTPATIENT
Start: 2023-08-29 | End: 2023-08-30 | Stop reason: HOSPADM

## 2023-08-29 RX ORDER — PANTOPRAZOLE SODIUM 40 MG/10ML
40 INJECTION, POWDER, LYOPHILIZED, FOR SOLUTION INTRAVENOUS EVERY 12 HOURS SCHEDULED
Status: DISCONTINUED | OUTPATIENT
Start: 2023-08-29 | End: 2023-08-30 | Stop reason: HOSPADM

## 2023-08-29 RX ORDER — ACETAMINOPHEN 325 MG/1
650 TABLET ORAL EVERY 6 HOURS PRN
Status: DISCONTINUED | OUTPATIENT
Start: 2023-08-29 | End: 2023-08-30 | Stop reason: HOSPADM

## 2023-08-29 RX ORDER — POLYETHYLENE GLYCOL 3350 17 G/17G
238 POWDER, FOR SOLUTION ORAL ONCE
Status: COMPLETED | OUTPATIENT
Start: 2023-08-29 | End: 2023-08-29

## 2023-08-29 RX ORDER — ONDANSETRON 2 MG/ML
4 INJECTION INTRAMUSCULAR; INTRAVENOUS EVERY 6 HOURS PRN
Status: DISCONTINUED | OUTPATIENT
Start: 2023-08-29 | End: 2023-08-30 | Stop reason: HOSPADM

## 2023-08-29 RX ORDER — HYDROMORPHONE HCL IN WATER/PF 6 MG/30 ML
0.2 PATIENT CONTROLLED ANALGESIA SYRINGE INTRAVENOUS ONCE
Status: COMPLETED | OUTPATIENT
Start: 2023-08-29 | End: 2023-08-29

## 2023-08-29 RX ADMIN — PANTOPRAZOLE SODIUM 40 MG: 40 INJECTION, POWDER, FOR SOLUTION INTRAVENOUS at 11:26

## 2023-08-29 RX ADMIN — HYDROMORPHONE HYDROCHLORIDE 0.2 MG: 0.2 INJECTION, SOLUTION INTRAMUSCULAR; INTRAVENOUS; SUBCUTANEOUS at 01:25

## 2023-08-29 RX ADMIN — POLYETHYLENE GLYCOL 3350 238 G: 17 POWDER, FOR SOLUTION ORAL at 17:35

## 2023-08-29 RX ADMIN — FAMOTIDINE 20 MG: 10 INJECTION, SOLUTION INTRAVENOUS at 04:03

## 2023-08-29 RX ADMIN — SODIUM CHLORIDE, SODIUM GLUCONATE, SODIUM ACETATE, POTASSIUM CHLORIDE, MAGNESIUM CHLORIDE, SODIUM PHOSPHATE, DIBASIC, AND POTASSIUM PHOSPHATE 100 ML/HR: .53; .5; .37; .037; .03; .012; .00082 INJECTION, SOLUTION INTRAVENOUS at 11:27

## 2023-08-29 RX ADMIN — PANTOPRAZOLE SODIUM 40 MG: 40 INJECTION, POWDER, FOR SOLUTION INTRAVENOUS at 21:20

## 2023-08-29 RX ADMIN — IOHEXOL 100 ML: 350 INJECTION, SOLUTION INTRAVENOUS at 01:39

## 2023-08-29 RX ADMIN — DICYCLOMINE HYDROCHLORIDE 10 MG: 10 CAPSULE ORAL at 17:35

## 2023-08-29 RX ADMIN — SODIUM CHLORIDE 1000 ML: 0.9 INJECTION, SOLUTION INTRAVENOUS at 01:25

## 2023-08-29 RX ADMIN — DICYCLOMINE HYDROCHLORIDE 10 MG: 10 CAPSULE ORAL at 21:20

## 2023-08-29 RX ADMIN — DICYCLOMINE HYDROCHLORIDE 10 MG: 10 CAPSULE ORAL at 11:26

## 2023-08-29 NOTE — CASE MANAGEMENT
Case Management Assessment & Discharge Planning Note    Patient name Greta Vuong  Location ED 14/ED 14 MRN [id number]  : 1980 Date 2023       Current Admission Date: 2023  Current Admission Diagnosis:Epigastric pain  Patient Active Problem List    Diagnosis Date Noted   • Epigastric pain 2023      LOS (days): 0  Geometric Mean LOS (GMLOS) (days):   Days to GMLOS:     OBJECTIVE:              Current admission status: Observation       Preferred Pharmacy:   PATIENT/FAMILY REPORTS NO PREFERRED PHARMACY  No address on file      Primary Care Provider: No primary care provider on file. Primary Insurance: [address] [place] [address]  Secondary Insurance:     ASSESSMENT:  Active Health Care Proxies    There are no active Health Care Proxies on file. Advance Directives  Does patient have a [address]?: No  Was patient offered paperwork?: Yes (not interested)  Does patient currently have a Health Care decision maker?: Yes, please see Health Care Proxy section  Does patient have Advance Directives?: No  Was patient offered paperwork?: Yes         Readmission Root Cause  30 Day Readmission: No    Patient Information  Admitted from[de-identified] Home  Mental Status: Alert  During Assessment patient was accompanied by: Spouse  Assessment information provided by[de-identified] Patient  Primary Caregiver: Self  Support Systems: Spouse/significant other  [facility]: [address] [place] [address] do you live in?: [address] [facility] entry access options. Select all that apply.: Stairs  Number of steps to enter home. : 1  Do the steps have railings?: No  Type of Current Residence: [address] [place] home  Upon entering residence, is there a bedroom on the main floor (no further steps)?: No  A bedroom is located on the following floor levels of residence (select all that apply):: 2nd Floor  Upon entering residence, is there a bathroom on the main floor (no further steps)?: Yes  Number of steps to 2nd floor from main floor: One Flight  In the last 12 months, was there a time when you were not able to pay the mortgage or rent on time?: No  In the last 12 months, how many places have you lived?: 1  In the last 12 months, was there a time when you did not have a steady place to sleep or slept in a shelter (including now)?: No  Homeless/housing insecurity resource given?: No  Living Arrangements: Lives w/ Spouse/significant other  Is patient a ?: No    Activities of Daily Living Prior to Admission  Functional Status: Independent  Completes ADLs independently?: Yes  Ambulates independently?: Yes  Does patient use assisted devices?: No  Does patient currently own DME?: No  Does patient have a history of Outpatient Therapy (PT/OT)?: No  Does the patient have a history of Short-Term Rehab?: No  Does patient have a history of HHC?: No  Does patient currently have [facility]?: No         Patient Information Continued  Income Source: Employed  Does patient have prescription coverage?: Yes  Within the past 12 months, you worried that your food would run out before you got the money to buy more.: Never true  Within the past 12 months, the food you bought just didn't last and you didn't have money to get more.: Never true  Food insecurity resource given?: No  Does patient receive dialysis treatments?: No  Does patient have a history of substance abuse?: No  Does patient have a history of Mental Health Diagnosis?: No         Means of Transportation  Means of Transport to Appts[de-identified] Drives Self  In the past 12 months, has lack of transportation kept you from medical appointments or from getting medications?: No  In the past 12 months, has lack of transportation kept you from meetings, work, or from getting things needed for daily living?: No  Was application for public transport provided?: No        DISCHARGE DETAILS:    Discharge planning discussed with[de-identified] patient  Freedom of Choice: Yes  Comments - Freedom of Choice: No Dc needs apparent  CM contacted family/caregiver?: No- see comments                  [facility]          Is the patient interested in [facility] at discharge?: No    DME Referral Provided  Referral made for DME?: No    Other Referral/Resources/Interventions Provided:  Referral Comments: No Dc needs apparent         Treatment Team Recommendation: Home  Discharge Destination Plan[de-identified] Home  Transport at Discharge : Automobile, Family

## 2023-08-29 NOTE — ASSESSMENT & PLAN NOTE
37 yo male who presents with 1 week history of epigastric pain radiating into his right lower back. No association with nausea vomiting blood in stool/black tarry stools. Pain has been fluctuating in intensity. Patient has presented to the ED 3 times for evaluation. · CT abdomen pelvis notes interval small bowel dilatation with long segment of small bowel mural thickening and enhancement potentially reflecting inflammatory bowel disease  · Right upper quadrant ultrasound negative  · LFTs within normal limits/lipase normal/UA negative  · CRP elevated at 49.6. · GI consulted.   Concern for underlying inflammatory bowel disease  · Started on as needed Tylenol/Bentyl/Dilaudid for pain control  · Keep patient n.p.o./IV fluids/IV Protonix

## 2023-08-29 NOTE — DISCHARGE INSTRUCTIONS
Follow-up with GI for further evaluation and treatment of epigastric pain. I provided you with a prescription for Pepcid. Take as directed. Pepcid helps to decrease gastric acid secretion. Schedule ultrasound of right upper quadrant to further characterize structures of right upper quadrant. Return to ED for new or worsening symptoms. Also recommend trialing Tums when pain is present. You can buy Tums at any grocery store or pharmacy. You can also try Mylanta. Mylanta is also available at the pharmacy and grocery store.      Phone number for [facility]:   [phone]

## 2023-08-29 NOTE — ED PROVIDER NOTES
History  Chief Complaint   Patient presents with   • Abdominal Pain     Pt arrives via EMS for abdominal pain. States was seen here yesterday and dx with gallstones. Pain became severe around 4pm. Took Toradol around 9pm with no relief. Denies n/v/d.      49-year-old male presents ED for evaluation of persistent epigastric abdominal pain. The past 2 nights he has visited the ED for the same complaint. He has had persistently benign labs the past 2 nights. 2 nights ago he had a ultrasound of his right upper quadrant performed in which he had suspected gallstones. Ultrasound was not performed by official ultrasound tech. He was given a referral to GI and had an outpatient ultrasound. Patient has not attended either the GI referral or outpatient ultrasound. He does have a scheduled appointment with GI on September 12, 2023. Last night he had a CT of abdomen and pelvis with IV contrast.  No cholecystitis or gallstones were seen. This evening patient took his prescribed medication of Toradol and Protonix. He states that at 9 PM he took Toradol and the pain reduced a little bit however it returned aggressively around midnight. This prompted patient to return to ED. He denies fever, chills, chest pain, shortness of breath, syncope, seizure, loss of motor function of extremity, loss of touch sensation of extremity. Denies eating anything abnormal today. Denies consuming alcohol today. Patient is frustrated by the persistence of the abdominal pain and lack of resolution of symptoms. Seeking to have more assessments done. Prior to Admission Medications   Prescriptions Last Dose Informant Patient Reported?  Taking?   ketorolac (TORADOL) 10 mg tablet   No No   Sig: Take 1 tablet (10 mg total) by mouth every 6 (six) hours as needed for moderate pain   pantoprazole (PROTONIX) 40 mg tablet   No No   Sig: Take 1 tablet (40 mg total) by mouth daily for 14 days   sucralfate (CARAFATE) 1 g tablet   No No Sig: Take 1 tablet (1 g total) by mouth 4 (four) times a day for 14 days      Facility-Administered Medications: None       History reviewed. No pertinent past medical history. History reviewed. No pertinent surgical history. History reviewed. No pertinent family history. I have reviewed and agree with the history as documented. E-Cigarette/Vaping     E-Cigarette/Vaping Substances     Social History     Tobacco Use   • Smoking status: Never   • Smokeless tobacco: Never   Substance Use Topics   • Alcohol use: Never   • Drug use: Never       Review of Systems   Constitutional: Negative for chills, diaphoresis, fatigue and fever. HENT: Negative for ear pain and sore throat. Eyes: Negative for pain and visual disturbance. Respiratory: Negative for cough, shortness of breath, wheezing and stridor. Cardiovascular: Negative for chest pain and palpitations. Gastrointestinal: Positive for abdominal pain and nausea. Negative for blood in stool, constipation, diarrhea and vomiting. Genitourinary: Negative for dysuria, flank pain, frequency, hematuria, penile discharge, penile pain, penile swelling, scrotal swelling and testicular pain. Musculoskeletal: Negative for arthralgias and back pain. Skin: Negative for color change and rash. Neurological: Negative for dizziness, tremors, seizures, syncope, facial asymmetry, speech difficulty, weakness, light-headedness, numbness and headaches. All other systems reviewed and are negative. Physical Exam  Physical Exam  Vitals and nursing note reviewed. Constitutional:       General: He is not in acute distress. Appearance: He is well-developed. He is not ill-appearing, toxic-appearing or diaphoretic. HENT:      Head: Normocephalic and atraumatic. Eyes:      Conjunctiva/sclera: Conjunctivae normal.   Cardiovascular:      Rate and Rhythm: Normal rate and regular rhythm. Heart sounds: No murmur heard.   Pulmonary:      Effort: Pulmonary effort is normal. No respiratory distress. Breath sounds: Normal breath sounds. Abdominal:      General: Bowel sounds are normal.      Palpations: Abdomen is soft. There is no shifting dullness, fluid wave, hepatomegaly, splenomegaly, mass or pulsatile mass. Tenderness: There is abdominal tenderness in the epigastric area. There is guarding. There is no right CVA tenderness, left CVA tenderness or rebound. Negative signs include Smith's sign, Rovsing's sign and McBurney's sign. Hernia: No hernia is present. Musculoskeletal:         General: No swelling. Cervical back: Neck supple. Skin:     General: Skin is warm and dry. Capillary Refill: Capillary refill takes less than 2 seconds. Neurological:      Mental Status: He is alert.    Psychiatric:         Mood and Affect: Mood normal.         Vital Signs  ED Triage Vitals [08/29/23 0020]   Temperature Pulse Respirations Blood Pressure SpO2   98.1 °F (36.7 °C) 58 18 118/73 97 %      Temp Source Heart Rate Source Patient Position - Orthostatic VS BP Location FiO2 (%)   Oral Monitor Lying Right arm --      Pain Score       10 - Worst Possible Pain           Vitals:    08/29/23 0020 08/29/23 0405 08/29/23 0730   BP: 118/73 120/79 108/65   Pulse: 58 66 60   Patient Position - Orthostatic VS: Lying Sitting Sitting         Visual Acuity      ED Medications  Medications   HYDROmorphone HCl (DILAUDID) injection 0.2 mg (0.2 mg Intravenous Given 8/29/23 0125)   sodium chloride 0.9 % bolus 1,000 mL (0 mL Intravenous Stopped 8/29/23 0225)   iohexol (OMNIPAQUE) 350 MG/ML injection (SINGLE-DOSE) 100 mL (100 mL Intravenous Given 8/29/23 0139)   Famotidine (PF) (PEPCID) injection 20 mg (20 mg Intravenous Given 8/29/23 0403)       Diagnostic Studies  Results Reviewed     Procedure Component Value Units Date/Time    Comprehensive metabolic panel [[id number]] Collected: 08/29/23 0110    Lab Status: Final result Specimen: Blood from Arm, Left Updated: 08/29/23 0133     Sodium 137 mmol/L      Potassium 4.0 mmol/L      Chloride 105 mmol/L      CO2 28 mmol/L      ANION GAP 4 mmol/L      BUN 18 mg/dL      Creatinine 0.87 mg/dL      Glucose 100 mg/dL      Calcium 9.2 mg/dL      AST 24 U/L      ALT 29 U/L      Alkaline Phosphatase 70 U/L      Total Protein 6.9 g/dL      Albumin 4.1 g/dL      Total Bilirubin 0.59 mg/dL      eGFR 104 ml/min/1.73sq m     Narrative:      National Kidney Disease Foundation guidelines for Chronic Kidney Disease (CKD):   •  Stage 1 with normal or high GFR (GFR > 90 mL/min/1.73 square meters)  •  Stage 2 Mild CKD (GFR = 60-89 mL/min/1.73 square meters)  •  Stage 3A Moderate CKD (GFR = 45-59 mL/min/1.73 square meters)  •  Stage 3B Moderate CKD (GFR = 30-44 mL/min/1.73 square meters)  •  Stage 4 Severe CKD (GFR = 15-29 mL/min/1.73 square meters)  •  Stage 5 End Stage CKD (GFR <15 mL/min/1.73 square meters)  Note: GFR calculation is accurate only with a steady state creatinine    Lipase [[id number]]  (Normal) Collected: 08/29/23 0110    Lab Status: Final result Specimen: Blood from Arm, Left Updated: 08/29/23 0133     Lipase 25 u/L     CBC and differential [[id number]]  (Abnormal) Collected: 08/29/23 0110    Lab Status: Final result Specimen: Blood from Arm, Left Updated: 08/29/23 0115     WBC 8.39 Thousand/uL      RBC 4.82 Million/uL      Hemoglobin 14.4 g/dL      Hematocrit 42.4 %      MCV 88 fL      MCH 29.9 pg      MCHC 34.0 g/dL      RDW 13.1 %      MPV 8.8 fL      Platelets 612 Thousands/uL      nRBC 0 /100 WBCs      Neutrophils Relative 69 %      Immat GRANS % 0 %      Lymphocytes Relative 17 %      Monocytes Relative 12 %      Eosinophils Relative 2 %      Basophils Relative 0 %      Neutrophils Absolute 5.77 Thousands/µL      Immature Grans Absolute 0.01 Thousand/uL      Lymphocytes Absolute 1.41 Thousands/µL      Monocytes Absolute 1.00 Thousand/µL      Eosinophils Absolute 0.17 Thousand/µL      Basophils Absolute 0.03 Thousands/µL US right upper quadrant   Final Result by Brunilda Singh MD (08/29 0809)      Normal.      Workstation performed: [id number]         CT abdomen pelvis with contrast   Final Result by Brunilda Singh MD (08/29 0809)      Interval small bowel dilatation with long segment of small bowel mural thickening and enhancement potentially reflecting inflammatory bowel disease. Workstation performed: [id number]                    Procedures  Procedures         ED Course                               SBIRT 22yo+    Flowsheet Row Most Recent Value   Initial Alcohol Screen: US AUDIT-C     1. How often do you have a drink containing alcohol? 0 Filed at: 08/29/2023 0732   2. How many drinks containing alcohol do you have on a typical day you are drinking? 0 Filed at: 08/29/2023 0732   3a. Male UNDER 65: How often do you have five or more drinks on one occasion? 0 Filed at: 08/29/2023 0732   Audit-C Score 0 Filed at: 08/29/2023 1399   GAIL: How many times in the past year have you. .. Used an illegal drug or used a prescription medication for non-medical reasons? Never Filed at: 08/29/2023 2248                    Medical Decision Making  ED Coarse: Patient presents ED for persistent epigastric pain. He has been seen in the ED the past 2 nights for the same complaint. He has had labs performed without any findings suggestive of acute abdomen. He had a CT of abdomen and pelvis with IV contrast performed last night. No causes of acute abdomen were found. Patient states that the pain has been persistent despite using Toradol, Protonix. He is requesting further evaluation. On presentation he is afebrile, normotensive, nontachycardic, without respiratory distress. He does appear to be in physical pain. He is guarding his abdomen. He is wincing in pain.          DDX: Gastroenteritis, cholecystitis, choledocholithiasis, pancreatitis, hepatitis, bowel obstruction, mesenteric ischemia, ACS, IBS, IBD    Initial ED Plan: Will perform another set of labs including CMP, CBC, lipase. Also will obtain another CT of abdomen and pelvis with IV contrast.  Dilaudid for pain control. IV fluids. MDM:  I have reviewed the patient's vital signs, nursing notes, and other relevant ancillary testing/information. I have had a detailed discussion with the patient regarding the historical points, examination findings, and any diagnostic results    Results:  Reviewed at bedside: CMP returned WNL. Lipase returned WNL. CBC returned without concerning findings. EKG returned without sign of acute cardiac injury. CT abdomen pelvis returned with "Interval small bowel dilatation with long segment of small bowel mural thickening and enhancement potentially reflecting inflammatory bowel disease". I discussed these findings with patient. He is still having abdominal pain. Attempted to reinforce labs and imaging have consistently indicated patient is not experiencing acute abdomen. Attempted to discharge patient with outpatient follow-up with GI and symptomatic treatment, however he is asking to stay for further evaluation. I advised patient that he can stay until the morning to see if GI would be able to stop by and see him. Patient agreeable to plan. Provided patient with dose of famotidine. Once he got closer to 7 AM, ordered a ultrasound of right upper quadrant for further visualization. This study came back normal.  Patient reporting that his abdominal pain is improved. ED Final Assessment: Epigastric pain due to gastroenteritis versus peptic ulcer disease versus IBD. Further assessment with GI would be beneficial for definitive diagnosis. Signed off to [facility]       Amount and/or Complexity of Data Reviewed  Radiology: ordered. Risk  Prescription drug management.           Disposition  Final diagnoses:   Epigastric pain     Time reflects when diagnosis was documented in both MDM as applicable and the Disposition within this note     Time User Action Codes Description Comment    8/29/2023  3:44 AM Rae Jamil Add [R10.13] Epigastric pain       ED Disposition     ED Disposition   Discharge    Condition   Stable    Date/Time   Tue Aug 29, 2023  3:44 AM    Comment   Duran Capps discharge to home/self care. Follow-up Information    None         Patient's Medications   Discharge Prescriptions    FAMOTIDINE (PEPCID) 20 MG TABLET    Take 1 tablet (20 mg total) by mouth 2 (two) times a day       Start Date: 8/29/2023 End Date: --       Order Dose: 20 mg       Quantity: 30 tablet    Refills: 0       No discharge procedures on file.     PDMP Review     None          ED Provider  Electronically Signed by           Anna Cross PA-C  08/29/23 6395

## 2023-08-29 NOTE — CONSULTS
Consultation - [facility]) [facility]  Kay Thornton 38 y.o. male MRN: [id number]  Unit/Bed#: ED 14 Encounter: [id number]         Reason for Consult / Principal Problem: Abnormal CT small bowel    HPI: Cong Devine is a 77-year-old male who presented to the emergency room for the third time with upper abdominal pain radiating into his back. At baseline, the patient reports that he has noticed increasing constipation and poor appetite. Otherwise, does not look at the color of his stool he reports. Patient had a initial CT scan on August 28 which was actually unremarkable. Patient returned again with the same symptoms and increasing severity. Hemoglobin 14.4. CT on this admission with IV contrast revealing small bowel dilation with long segment of small bowel thickening and enhancement suspicious for inflammatory bowel disease. Patient CRP elevated at 49, fortunately lactic is normal.     He has never had an EGD or colonoscopy. To his knowledge, there is no family history of malignancy or chronic GI conditions. Review of Systems:    CONSTITUTIONAL: Denies any fever, chills, or rigors. Positive for poor appetite. Negative for weight loss. HEENT: No earache or tinnitus. Denies hearing loss or visual disturbances. CARDIOVASCULAR: No chest pain or palpitations. RESPIRATORY: Denies any cough, hemoptysis, shortness of breath or dyspnea on exertion. GASTROINTESTINAL: As noted in the History of Present Illness. GENITOURINARY: No problems with urination. Denies any hematuria or dysuria. NEUROLOGIC: No dizziness or vertigo, denies headaches. MUSCULOSKELETAL: Denies any muscle or joint pain. SKIN: Denies skin rashes or itching. ENDOCRINE: Denies excessive thirst. Denies intolerance to heat or cold. PSYCHOSOCIAL: Denies depression or anxiety. Denies any recent memory loss. Historical Information   History reviewed. No pertinent past medical history. History reviewed.  No pertinent surgical history. Social History   Social History     Substance and Sexual Activity   Alcohol Use Never     Social History     Substance and Sexual Activity   Drug Use Never     Social History     Tobacco Use   Smoking Status Never   Smokeless Tobacco Never     History reviewed. No pertinent family history. Meds/Allergies     Current Facility-Administered Medications   Medication Dose Route Frequency   • acetaminophen (TYLENOL) tablet 650 mg  650 mg Oral Q6H PRN   • dicyclomine (BENTYL) capsule 10 mg  10 mg Oral 4x Daily (AC & HS)   • morphine injection 2 mg  2 mg Intravenous Q6H PRN   • multi-electrolyte (PLASMALYTE-A/ISOLYTE-S PH 7.4) IV solution  100 mL/hr Intravenous Continuous   • ondansetron (ZOFRAN) injection 4 mg  4 mg Intravenous Q6H PRN   • pantoprazole (PROTONIX) injection 40 mg  40 mg Intravenous Q12H [address]   • polyethylene glycol (GLYCOLAX) bowel prep 238 g  238 g Oral Once       No Known Allergies      Objective     Blood pressure 110/68, pulse 65, temperature 98.1 °F (36.7 °C), temperature source Oral, resp. rate 18, SpO2 97 %. Intake/Output Summary (Last 24 hours) at 8/29/2023 1526  Last data filed at 8/29/2023 0225  Gross per 24 hour   Intake 1000 ml   Output --   Net 1000 ml         PHYSICAL EXAM:      General Appearance:   Alert and oriented x 3. Cooperative, and in no respiratory distress   HEENT:   Normocephalic, atraumatic, anicteric. Neck:  Supple, symmetrical, trachea midline   Lungs:   Clear to auscultation bilaterally; no rales, rhonchi or wheezing; respirations unlabored    Heart[de-identified]   S1 and S2 normal; regular rate and rhythm; no murmur, rub, or gallop.    Abdomen:   Soft, non-tender, non-distended; normal bowel sounds; no masses, no organomegaly    Genitalia:   Deferred    Rectal:   Deferred    Extremities:  No cyanosis, clubbing or edema    Pulses:  2+ and symmetric all extremities    Skin:  Skin color, texture, turgor normal, no rashes or lesions    Lymph nodes:  No palpable cervical or supraclavicular lymphadenopathy        Lab Results:   Results from last 7 days   Lab Units 08/29/23  0110   WBC Thousand/uL 8.39   HEMOGLOBIN g/dL 14.4   HEMATOCRIT % 42.4   PLATELETS Thousands/uL 190   NEUTROS PCT % 69   LYMPHS PCT % 17   MONOS PCT % 12   EOS PCT % 2     Results from last 7 days   Lab Units 08/29/23  0110   POTASSIUM mmol/L 4.0   CHLORIDE mmol/L 105   CO2 mmol/L 28   BUN mg/dL 18   CREATININE mg/dL 0.87   CALCIUM mg/dL 9.2   ALK PHOS U/L 70   ALT U/L 29   AST U/L 24         Results from last 7 days   Lab Units 08/29/23  0110   LIPASE u/L 25       Imaging Studies:  US right upper quadrant    Result Date: 8/29/2023  Impression: Normal. Workstation performed: [id number]     CT abdomen pelvis with contrast    Result Date: 8/29/2023  Impression: Interval small bowel dilatation with long segment of small bowel mural thickening and enhancement potentially reflecting inflammatory bowel disease. Workstation performed: [id number]       ASSESSMENT and PLAN:      1) Small bowel thickening, elevated CRP - May be related to infectious or inflammatory etiology. Fortunately, lactic was within normal limits. As an outpatient, has constipation, poor appetite and fullness for at least 1 month leading up to his several emergency room visits. No family history of IBD to his knowledge. No diarrhea at this time. - Discussed with attending, plan for colonoscopy tomorrow to rule out inflammatory bowel disease, reviewed imaging with radiology and they believe the inflammation is proximal ileum. Discussed with with the patient as well. - We will likely require MR enterography or PillCam as an outpatient  - Check fecal calprotectin  - Trend CRP      The patient was seen and examined by Dr. Fernandez Camacho, all key medical decisions were made with Dr. Fernandez Camacho. Thank you for allowing us to participate in the care of this pleasant patient. We will follow up with you closely.     Portions of the record may have been created with voice recognition software. Occasional wrong word or "sound a like" substitutions may have occurred due to the inherent limitations of voice recognition software. Read the chart carefully and recognize, using context, where substitutions have occurred.

## 2023-08-29 NOTE — H&P
[address]  H&P  Name: Laura Christian 38 y.o. male I MRN: [id number]  Unit/Bed#: ED 15 I Date of Admission: 8/29/2023   Date of Service: 8/29/2023 I Hospital Day: 0      Assessment/Plan   Epigastric pain  Assessment & Plan  37 yo male who presents with 1 week history of epigastric pain radiating into his right lower back. No association with nausea vomiting blood in stool/black tarry stools. Pain has been fluctuating in intensity. Patient has presented to the ED 3 times for evaluation. · CT abdomen pelvis notes interval small bowel dilatation with long segment of small bowel mural thickening and enhancement potentially reflecting inflammatory bowel disease  · Right upper quadrant ultrasound negative  · LFTs within normal limits/lipase normal/UA negative  · CRP elevated at 49.6. · GI consulted. Concern for underlying inflammatory bowel disease  · Started on as needed Tylenol/Bentyl/Dilaudid for pain control  · Keep patient n.p.o./IV fluids/IV Protonix          VTE Prophylaxis: ambulatory   POLST: POLST form is not discussed and not completed at this time. Discussion with family: dw wife    Anticipated Length of Stay:  Patient will be admitted on an Observation basis with an anticipated length of stay of  Less than 2 midnights. Justification for Hospital Stay: GI Eval    Total Time for Visit, including Counseling / Coordination of Care: 30 minutes. Greater than 50% of this total time spent on direct patient counseling and coordination of care. Chief Complaint:   Abdominal pain    History of Present Illness:    Laura Christian is a 38 y.o. male with ongoing epigastric pain radiating into his right lower back. This has been going on for 1 week now. Patient reports sometimes pain is less intense but occasionally very painful. No associated nausea vomiting blood in stool/black tarry stools. He has presented to the ED for the third time for evaluation.   CT today is noting evidence of possible inflammatory bowel disease. GI recommended admission for work-up of possible Crohn's disease    Review of Systems:    Review of Systems   Constitutional: Positive for appetite change. Negative for activity change, chills, diaphoresis, fatigue and fever. HENT: Negative for congestion, postnasal drip and rhinorrhea. Respiratory: Negative for cough, shortness of breath and wheezing. Cardiovascular: Negative for chest pain, palpitations and leg swelling. Gastrointestinal: Positive for abdominal pain. Negative for blood in stool, constipation, diarrhea, nausea and vomiting. Genitourinary: Negative for dysuria, flank pain, frequency and hematuria. Musculoskeletal: Negative for gait problem and myalgias. Skin: Negative for rash. Neurological: Negative for dizziness, seizures, speech difficulty, light-headedness and headaches. Past Medical and Surgical History:     History reviewed. No pertinent past medical history. History reviewed. No pertinent surgical history. Meds/Allergies:    Prior to Admission medications    Medication Sig Start Date End Date Taking? Authorizing Provider   famotidine (PEPCID) 20 mg tablet Take 1 tablet (20 mg total) by mouth 2 (two) times a day 8/29/23  Yes Sapphire Holguin PA-C   ketorolac (TORADOL) 10 mg tablet Take 1 tablet (10 mg total) by mouth every 6 (six) hours as needed for moderate pain 8/27/23   Kp Samuels MD   pantoprazole (PROTONIX) 40 mg tablet Take 1 tablet (40 mg total) by mouth daily for 14 days 8/28/23 9/11/23  Latonya Euceda MD   sucralfate (CARAFATE) 1 g tablet Take 1 tablet (1 g total) by mouth 4 (four) times a day for 14 days 8/28/23 9/11/23  Latonya Euceda MD     I have reviewed home medications with patient personally.     Allergies: No Known Allergies    Social History:     Marital Status: /Civil Union     Substance Use History:   Social History     Substance and Sexual Activity   Alcohol Use Never     Social History     Tobacco Use   Smoking Status Never   Smokeless Tobacco Never     Social History     Substance and Sexual Activity   Drug Use Never       Family History:    non-contributory    Physical Exam:     Vitals:   Blood Pressure: 128/73 (08/29/23 1030)  Pulse: 68 (08/29/23 1030)  Temperature: 98.1 °F (36.7 °C) (08/29/23 0020)  Temp Source: Oral (08/29/23 0020)  Respirations: 18 (08/29/23 1030)  SpO2: 98 % (08/29/23 1030)    Physical Exam  Vitals reviewed. Constitutional:       General: He is not in acute distress. Appearance: He is not ill-appearing. HENT:      Head: Normocephalic and atraumatic. Nose: Nose normal. No congestion. Mouth/Throat:      Mouth: Mucous membranes are moist.      Pharynx: Oropharynx is clear. Eyes:      Conjunctiva/sclera: Conjunctivae normal.      Pupils: Pupils are equal, round, and reactive to light. Cardiovascular:      Rate and Rhythm: Normal rate and regular rhythm. Pulses: Normal pulses. Pulmonary:      Effort: Pulmonary effort is normal. No respiratory distress. Breath sounds: Normal breath sounds. No wheezing or rales. Abdominal:      General: Abdomen is flat. Bowel sounds are normal. There is no distension. Palpations: Abdomen is soft. Tenderness: There is abdominal tenderness. There is no guarding or rebound. Musculoskeletal:         General: No swelling. Normal range of motion. Cervical back: Normal range of motion and neck supple. Skin:     General: Skin is warm and dry. Findings: No rash. Neurological:      General: No focal deficit present. Mental Status: He is alert and oriented to person, place, and time. Psychiatric:         Mood and Affect: Mood normal.         Behavior: Behavior normal.       Additional Data:     Lab Results: I have personally reviewed pertinent reports.       Results from last 7 days   Lab Units 08/29/23  0110   WBC Thousand/uL 8.39   HEMOGLOBIN g/dL 14.4   HEMATOCRIT % 42.4 PLATELETS Thousands/uL 190   NEUTROS PCT % 69   LYMPHS PCT % 17   MONOS PCT % 12   EOS PCT % 2     Results from last 7 days   Lab Units 08/29/23  0110   SODIUM mmol/L 137   POTASSIUM mmol/L 4.0   CHLORIDE mmol/L 105   CO2 mmol/L 28   BUN mg/dL 18   CREATININE mg/dL 0.87   ANION GAP mmol/L 4   CALCIUM mg/dL 9.2   ALBUMIN g/dL 4.1   TOTAL BILIRUBIN mg/dL 0.59   ALK PHOS U/L 70   ALT U/L 29   AST U/L 24   GLUCOSE RANDOM mg/dL 100                 Results from last 7 days   Lab Units 08/29/23  0920   LACTIC ACID mmol/L 0.7       Imaging: I have personally reviewed pertinent reports. US right upper quadrant   Final Result by Charlee Odom MD (08/29 0809)      Normal.      Workstation performed: [id number]         CT abdomen pelvis with contrast   Final Result by Charlee Odom MD (08/29 0809)      Interval small bowel dilatation with long segment of small bowel mural thickening and enhancement potentially reflecting inflammatory bowel disease. Workstation performed: [id number]             [facility] Yes     ** Please Note: This note has been constructed using a voice recognition system.  **

## 2023-08-30 ENCOUNTER — ANESTHESIA EVENT (OUTPATIENT)
Dept: GASTROENTEROLOGY | Facility: HOSPITAL | Age: 43
End: 2023-08-30
Payer: COMMERCIAL

## 2023-08-30 ENCOUNTER — ANESTHESIA (OUTPATIENT)
Dept: GASTROENTEROLOGY | Facility: HOSPITAL | Age: 43
End: 2023-08-30
Payer: COMMERCIAL

## 2023-08-30 ENCOUNTER — APPOINTMENT (OUTPATIENT)
Dept: GASTROENTEROLOGY | Facility: HOSPITAL | Age: 43
End: 2023-08-30
Payer: COMMERCIAL

## 2023-08-30 VITALS
DIASTOLIC BLOOD PRESSURE: 91 MMHG | RESPIRATION RATE: 18 BRPM | SYSTOLIC BLOOD PRESSURE: 131 MMHG | HEART RATE: 57 BPM | OXYGEN SATURATION: 97 % | TEMPERATURE: 98 F

## 2023-08-30 LAB
ALBUMIN SERPL BCP-MCNC: 4 G/DL (ref 3.5–5)
ALP SERPL-CCNC: 56 U/L (ref 34–104)
ALT SERPL W P-5'-P-CCNC: 23 U/L (ref 7–52)
ANION GAP SERPL CALCULATED.3IONS-SCNC: 3 MMOL/L
AST SERPL W P-5'-P-CCNC: 19 U/L (ref 13–39)
BILIRUB SERPL-MCNC: 0.57 MG/DL (ref 0.2–1)
BUN SERPL-MCNC: 12 MG/DL (ref 5–25)
CALCIUM SERPL-MCNC: 9.3 MG/DL (ref 8.4–10.2)
CHLORIDE SERPL-SCNC: 108 MMOL/L (ref 96–108)
CO2 SERPL-SCNC: 30 MMOL/L (ref 21–32)
CREAT SERPL-MCNC: 0.85 MG/DL (ref 0.6–1.3)
ERYTHROCYTE [DISTWIDTH] IN BLOOD BY AUTOMATED COUNT: 13.1 % (ref 11.6–15.1)
GFR SERPL CREATININE-BSD FRML MDRD: 105 ML/MIN/1.73SQ M
GLUCOSE P FAST SERPL-MCNC: 99 MG/DL (ref 65–99)
GLUCOSE SERPL-MCNC: 99 MG/DL (ref 65–140)
HCT VFR BLD AUTO: 41.3 % (ref 36.5–49.3)
HGB BLD-MCNC: 13.5 G/DL (ref 12–17)
MCH RBC QN AUTO: 28.9 PG (ref 26.8–34.3)
MCHC RBC AUTO-ENTMCNC: 32.7 G/DL (ref 31.4–37.4)
MCV RBC AUTO: 88 FL (ref 82–98)
PLATELET # BLD AUTO: 216 THOUSANDS/UL (ref 149–390)
PMV BLD AUTO: 8.8 FL (ref 8.9–12.7)
POTASSIUM SERPL-SCNC: 4.5 MMOL/L (ref 3.5–5.3)
PROT SERPL-MCNC: 6.6 G/DL (ref 6.4–8.4)
RBC # BLD AUTO: 4.67 MILLION/UL (ref 3.88–5.62)
SODIUM SERPL-SCNC: 141 MMOL/L (ref 135–147)
WBC # BLD AUTO: 4.12 THOUSAND/UL (ref 4.31–10.16)

## 2023-08-30 PROCEDURE — 88342 IMHCHEM/IMCYTCHM 1ST ANTB: CPT | Performed by: PATHOLOGY

## 2023-08-30 PROCEDURE — C9113 INJ PANTOPRAZOLE SODIUM, VIA: HCPCS | Performed by: FAMILY MEDICINE

## 2023-08-30 PROCEDURE — 99239 HOSP IP/OBS DSCHRG MGMT >30: CPT | Performed by: FAMILY MEDICINE

## 2023-08-30 PROCEDURE — 80053 COMPREHEN METABOLIC PANEL: CPT | Performed by: FAMILY MEDICINE

## 2023-08-30 PROCEDURE — 88305 TISSUE EXAM BY PATHOLOGIST: CPT | Performed by: PATHOLOGY

## 2023-08-30 PROCEDURE — 85027 COMPLETE CBC AUTOMATED: CPT | Performed by: FAMILY MEDICINE

## 2023-08-30 PROCEDURE — 88341 IMHCHEM/IMCYTCHM EA ADD ANTB: CPT | Performed by: PATHOLOGY

## 2023-08-30 RX ORDER — DICYCLOMINE HYDROCHLORIDE 10 MG/1
10 CAPSULE ORAL
Qty: 10 CAPSULE | Refills: 0 | Status: SHIPPED | OUTPATIENT
Start: 2023-08-30 | End: 2023-09-02

## 2023-08-30 RX ORDER — PROPOFOL 10 MG/ML
INJECTION, EMULSION INTRAVENOUS AS NEEDED
Status: DISCONTINUED | OUTPATIENT
Start: 2023-08-30 | End: 2023-08-30

## 2023-08-30 RX ORDER — MAGNESIUM HYDROXIDE/ALUMINUM HYDROXICE/SIMETHICONE 120; 1200; 1200 MG/30ML; MG/30ML; MG/30ML
30 SUSPENSION ORAL EVERY 4 HOURS PRN
Status: DISCONTINUED | OUTPATIENT
Start: 2023-08-30 | End: 2023-08-30 | Stop reason: HOSPADM

## 2023-08-30 RX ORDER — SODIUM CHLORIDE, SODIUM LACTATE, POTASSIUM CHLORIDE, CALCIUM CHLORIDE 600; 310; 30; 20 MG/100ML; MG/100ML; MG/100ML; MG/100ML
INJECTION, SOLUTION INTRAVENOUS CONTINUOUS PRN
Status: DISCONTINUED | OUTPATIENT
Start: 2023-08-30 | End: 2023-08-30

## 2023-08-30 RX ORDER — SIMETHICONE 80 MG
80 TABLET,CHEWABLE ORAL EVERY 6 HOURS PRN
Status: DISCONTINUED | OUTPATIENT
Start: 2023-08-30 | End: 2023-08-30 | Stop reason: HOSPADM

## 2023-08-30 RX ORDER — LIDOCAINE HYDROCHLORIDE 20 MG/ML
INJECTION, SOLUTION EPIDURAL; INFILTRATION; INTRACAUDAL; PERINEURAL AS NEEDED
Status: DISCONTINUED | OUTPATIENT
Start: 2023-08-30 | End: 2023-08-30

## 2023-08-30 RX ADMIN — PROPOFOL 20 MG: 10 INJECTION, EMULSION INTRAVENOUS at 13:11

## 2023-08-30 RX ADMIN — PROPOFOL 140 MG: 10 INJECTION, EMULSION INTRAVENOUS at 13:09

## 2023-08-30 RX ADMIN — LIDOCAINE HYDROCHLORIDE 60 MG: 20 INJECTION, SOLUTION EPIDURAL; INFILTRATION; INTRACAUDAL; PERINEURAL at 13:06

## 2023-08-30 RX ADMIN — PROPOFOL 20 MG: 10 INJECTION, EMULSION INTRAVENOUS at 13:17

## 2023-08-30 RX ADMIN — PROPOFOL 20 MG: 10 INJECTION, EMULSION INTRAVENOUS at 13:20

## 2023-08-30 RX ADMIN — SIMETHICONE 80 MG: 80 TABLET, CHEWABLE ORAL at 18:53

## 2023-08-30 RX ADMIN — PANTOPRAZOLE SODIUM 40 MG: 40 INJECTION, POWDER, FOR SOLUTION INTRAVENOUS at 08:42

## 2023-08-30 RX ADMIN — PROPOFOL 20 MG: 10 INJECTION, EMULSION INTRAVENOUS at 13:14

## 2023-08-30 RX ADMIN — SODIUM CHLORIDE, SODIUM LACTATE, POTASSIUM CHLORIDE, AND CALCIUM CHLORIDE: .6; .31; .03; .02 INJECTION, SOLUTION INTRAVENOUS at 13:00

## 2023-08-30 RX ADMIN — DICYCLOMINE HYDROCHLORIDE 10 MG: 10 CAPSULE ORAL at 06:12

## 2023-08-30 RX ADMIN — PROPOFOL 20 MG: 10 INJECTION, EMULSION INTRAVENOUS at 13:23

## 2023-08-30 RX ADMIN — LIDOCAINE HYDROCHLORIDE 40 MG: 20 INJECTION, SOLUTION EPIDURAL; INFILTRATION; INTRACAUDAL; PERINEURAL at 13:09

## 2023-08-30 RX ADMIN — DICYCLOMINE HYDROCHLORIDE 10 MG: 10 CAPSULE ORAL at 18:18

## 2023-08-30 NOTE — PLAN OF CARE

## 2023-08-30 NOTE — ASSESSMENT & PLAN NOTE
39 yo male who presents with 1 week history of epigastric pain radiating into his right lower back. No association with nausea vomiting blood in stool/black tarry stools. Pain has been fluctuating in intensity. Patient has presented to the ED 3 times for evaluation. · CT abdomen pelvis notes interval small bowel dilatation with long segment of small bowel mural thickening and enhancement potentially reflecting inflammatory bowel disease  · Right upper quadrant ultrasound negative  · LFTs within normal limits/lipase normal/UA negative  · CRP elevated at 49.6. · GI consulted. Concern for underlying inflammatory bowel disease  · EGD noted normal.  Mild edema/erythematous mucosa in antrum. Biopsies sent  · Colonoscopy appeared normal, biopsies sent  · Ambulatory referral placed for GI. Patient recommended to follow-up in 1 to 2 weeks.   · Plan for OP MR enterography  · on as needed Tylenol/Bentyl

## 2023-08-30 NOTE — DISCHARGE SUMMARY
[address] [facility] 1980, 38 y.o. male MRN: [id number]  Unit/Bed#: -02 Encounter: [id number]  Primary Care Provider: No primary care provider on file. Date and time admitted to hospital: 8/29/2023 12:17 AM    * Epigastric pain  Assessment & Plan  39 yo male who presents with 1 week history of epigastric pain radiating into his right lower back. No association with nausea vomiting blood in stool/black tarry stools. Pain has been fluctuating in intensity. Patient has presented to the ED 3 times for evaluation. · CT abdomen pelvis notes interval small bowel dilatation with long segment of small bowel mural thickening and enhancement potentially reflecting inflammatory bowel disease  · Right upper quadrant ultrasound negative  · LFTs within normal limits/lipase normal/UA negative  · CRP elevated at 49.6. · GI consulted. Concern for underlying inflammatory bowel disease  · EGD noted normal.  Mild edema/erythematous mucosa in antrum. Biopsies sent  · Colonoscopy appeared normal, biopsies sent  · Ambulatory referral placed for GI. Patient recommended to follow-up in 1 to 2 weeks. · Plan for OP MR enterography  · on as needed Tylenol/Bentyl      Discharging Physician / Practitioner: Lolly Mena MD  PCP: No primary care provider on file. Admission Date:   Admission Orders (From admission, onward)     Ordered        08/29/23 1034  Place in Observation  Once            08/29/23 1035  Place in Observation  Once                      Discharge Date: 08/30/23    Disposition:      · Home     Reason for Admission: epigastric pain    Discharge Diagnoses:     Please see assessment and plan section above for further details regarding discharge diagnoses.      Medical Problems     Resolved Problems  Date Reviewed: 8/30/2023   None         Consultations During Hospital Stay:  · GI    Procedures Performed:   · EGD  · Colonoscopy     Medication Adjustments and Discharge Medications:  · Summary of Medication Adjustments made as a result of this hospitalization: see med rec  · Medication Dosing Tapers - Please refer to Discharge Medication List for details on any medication dosing tapers (if applicable to patient). · Medications being temporarily held (include recommended restart time): see med rec  · Discharge Medication List: See after visit summary for reconciled discharge medications. Diet Recommendations at Discharge:  Diet -        Diet Orders   (From admission, onward)             Start     Ordered    08/30/23 1331  Diet Regular; Regular House  Diet effective now        References:    Adult Nutrition Support Algorithm    RD Therapeutic Diet Order Protocol   Question Answer Comment   Diet Type Regular    Regular Regular House        08/30/23 1330                  Hospital Course:     Ulises Murray is a 38 y.o. male patient who originally presented to the hospital on 8/29/2023 being admitted with 1 week history of epigastric pain radiating into his right lower back. No associated nausea vomiting blood in stool or black tarry stools. CT abdomen pelvis noted interval small bowel dilatation with long segment of small bowel mural thickening and enhancement potentially reflecting inflammatory bowel disease. Right upper quadrant ultrasound negative. LFTs/lipase/UA negative. CRP was elevated. GI consulted. Concern for underlying inflammatory bowel disease. EGD/colonoscopy within normal limits, biopsy sent. Ambulatory referral placed for GI. Recommended outpatient follow-up in 1 to 2 weeks. Plan for outpatient MR enterography. On as needed Tylenol/Bentyl  Patient clinically improved. Verbalizes understanding of plan above and is in agreement. DC home.     Condition at Discharge: fair     Discharge Day Visit / Exam:     Vitals: Blood Pressure: 131/91 (08/30/23 1417)  Pulse: 67 (08/30/23 1417)  Temperature: 98 °F (36.7 °C) (08/30/23 1417)  Temp Source: Temporal (08/30/23 1330)  Respirations: 18 (08/30/23 1417)  SpO2: 98 % (08/30/23 1417)  Exam:   Physical Exam  Constitutional:       General: He is not in acute distress. Appearance: He is normal weight. He is not toxic-appearing. HENT:      Mouth/Throat:      Mouth: Mucous membranes are moist.      Pharynx: Oropharynx is clear. Cardiovascular:      Rate and Rhythm: Normal rate and regular rhythm. Pulses: Normal pulses. Pulmonary:      Effort: Pulmonary effort is normal. No respiratory distress. Breath sounds: Normal breath sounds. Abdominal:      General: Abdomen is flat. Bowel sounds are normal.      Palpations: Abdomen is soft. Musculoskeletal:      Right lower leg: No edema. Left lower leg: No edema. Neurological:      General: No focal deficit present. Mental Status: He is alert and oriented to person, place, and time. Goals of Care Discussions:  · Code Status at Discharge: Level 1 - Full Code      Discharge instructions/Information to patient and family:   See after visit summary section titled Discharge Instructions for information provided to patient and family. Discharge Statement:  I spent 40 minutes discharging the patient. This time was spent on the day of discharge. I had direct contact with the patient on the day of discharge. Greater than 50% of the total time was spent examining patient, answering all patient questions, arranging and discussing plan of care with patient as well as directly providing post-discharge instructions. Additional time then spent on discharge activities.     ** Please Note: This note has been constructed using a voice recognition system **

## 2023-08-30 NOTE — PLAN OF CARE
Problem: PAIN - ADULT  Goal: Verbalizes/displays adequate comfort level or baseline comfort level  Description: Interventions:  - Encourage patient to monitor pain and request assistance  - Assess pain using appropriate pain scale  - Administer analgesics based on type and severity of pain and evaluate response  - Implement non-pharmacological measures as appropriate and evaluate response  - Consider cultural and social influences on pain and pain management  - Notify physician/advanced practitioner if interventions unsuccessful or patient reports new pain  Outcome: Progressing     Problem: INFECTION - ADULT  Goal: Absence or prevention of progression during hospitalization  Description: INTERVENTIONS:  - Assess and monitor for signs and symptoms of infection  - Monitor lab/diagnostic results  - Monitor all insertion sites, i.e. indwelling lines, tubes, and drains  - Monitor endotracheal if appropriate and nasal secretions for changes in amount and color  - [place] appropriate cooling/warming therapies per order  - Administer medications as ordered  - Instruct and encourage patient and family to use good hand hygiene technique  - Identify and instruct in appropriate isolation precautions for identified infection/condition  Outcome: Progressing     Problem: SAFETY ADULT  Goal: Maintain or return to baseline ADL function  Description: INTERVENTIONS:  - Educate patient/family on patient safety including physical limitations  - Instruct patient to call for assistance with activity   - Consult OT/PT to assist with strengthening/mobility   - Keep Call bell within reach  - Keep bed low and locked with side rails adjusted as appropriate  - Keep care items and personal belongings within reach  - Initiate and maintain comfort rounds    Outcome: Progressing     Problem: DISCHARGE PLANNING  Goal: Discharge to home or other facility with appropriate resources  Description: INTERVENTIONS:  - Identify barriers to discharge w/patient and caregiver  - Arrange for needed discharge resources and transportation as appropriate  - Identify discharge learning needs (meds, wound care, etc.)  - Arrange for interpretive services to assist at discharge as needed  - Refer to Case Management Department for coordinating discharge planning if the patient needs post-hospital services based on physician/advanced practitioner order or complex needs related to functional status, cognitive ability, or social support system  Outcome: Progressing     Problem: Knowledge Deficit  Goal: Patient/family/caregiver demonstrates understanding of disease process, treatment plan, medications, and discharge instructions  Description: Complete learning assessment and assess knowledge base.   Interventions:  - Provide teaching at level of understanding  - Provide teaching via preferred learning methods  Outcome: Progressing

## 2023-08-30 NOTE — ANESTHESIA POSTPROCEDURE EVALUATION
Post-Op Assessment Note    CV Status:  Stable  Pain Score: 0    Pain management: adequate     Mental Status:  Arousable and sleepy   Hydration Status:  Euvolemic   PONV Controlled:  Controlled   Airway Patency:  Patent      Post Op Vitals Reviewed: Yes      Staff: CRNA         No notable events documented.     BP   101/68   Temp      Pulse 78   Resp 18   SpO2 98% RA

## 2023-08-30 NOTE — ANESTHESIA PREPROCEDURE EVALUATION
Procedure:  COLONOSCOPY  EGD    Relevant Problems   Other   (+) Epigastric pain        Physical Exam    Airway    Mallampati score: II  TM Distance: >3 FB  Neck ROM: full     Dental   No notable dental hx     Cardiovascular  Rhythm: regular, Rate: normal, Cardiovascular exam normal    Pulmonary  Pulmonary exam normal Breath sounds clear to auscultation,     Other Findings        Anesthesia Plan  ASA Score- 2     Anesthesia Type- IV sedation with anesthesia with ASA Monitors. Additional Monitors:   Airway Plan:     Comment: Discussed risks/benefits, including medication reactions, awareness, aspiration, and serious/life threatening complications. Plan to maintain native airway with IVGA, monitored with EtCO2. Plan Factors-Exercise tolerance (METS): >4 METS. Chart reviewed. Patient summary reviewed. Patient instructed to abstain from smoking on day of procedure. Patient did not smoke on day of surgery. Induction- intravenous. Postoperative Plan-     Informed Consent- Anesthetic plan and risks discussed with patient. I personally reviewed this patient with the CRNA. Discussed and agreed on the Anesthesia Plan with the CRNA. Dexter Escobar

## 2023-09-02 LAB — CALPROTECTIN STL-MCNT: 2250 UG/G (ref 0–120)

## 2023-09-06 PROCEDURE — 88305 TISSUE EXAM BY PATHOLOGIST: CPT | Performed by: PATHOLOGY

## 2023-09-06 PROCEDURE — 88342 IMHCHEM/IMCYTCHM 1ST ANTB: CPT | Performed by: PATHOLOGY

## 2023-09-06 PROCEDURE — 88341 IMHCHEM/IMCYTCHM EA ADD ANTB: CPT | Performed by: PATHOLOGY

## 2023-09-07 ENCOUNTER — OFFICE VISIT (OUTPATIENT)
Age: 43
End: 2023-09-07
Payer: COMMERCIAL

## 2023-09-07 VITALS
RESPIRATION RATE: 16 BRPM | BODY MASS INDEX: 26.9 KG/M2 | SYSTOLIC BLOOD PRESSURE: 100 MMHG | HEIGHT: 66 IN | DIASTOLIC BLOOD PRESSURE: 78 MMHG | WEIGHT: 167.4 LBS | OXYGEN SATURATION: 97 % | HEART RATE: 61 BPM

## 2023-09-07 DIAGNOSIS — Z11.59 NEED FOR HEPATITIS B SCREENING TEST: ICD-10-CM

## 2023-09-07 DIAGNOSIS — R10.13 EPIGASTRIC PAIN: Primary | ICD-10-CM

## 2023-09-07 DIAGNOSIS — Z11.59 ENCOUNTER FOR HEPATITIS C SCREENING TEST FOR LOW RISK PATIENT: ICD-10-CM

## 2023-09-07 DIAGNOSIS — A04.8 H. PYLORI INFECTION: ICD-10-CM

## 2023-09-07 DIAGNOSIS — R19.5 ELEVATED FECAL CALPROTECTIN: ICD-10-CM

## 2023-09-07 PROCEDURE — 99214 OFFICE O/P EST MOD 30 MIN: CPT | Performed by: INTERNAL MEDICINE

## 2023-09-07 RX ORDER — AMOXICILLIN 500 MG/1
1000 CAPSULE ORAL EVERY 12 HOURS SCHEDULED
Qty: 56 CAPSULE | Refills: 0 | Status: SHIPPED | OUTPATIENT
Start: 2023-09-07 | End: 2023-09-21

## 2023-09-07 RX ORDER — PANTOPRAZOLE SODIUM 40 MG/1
40 TABLET, DELAYED RELEASE ORAL 2 TIMES DAILY
Qty: 28 TABLET | Refills: 0 | Status: SHIPPED | OUTPATIENT
Start: 2023-09-07

## 2023-09-07 RX ORDER — OMEPRAZOLE 20 MG/1
20 CAPSULE, DELAYED RELEASE ORAL DAILY
COMMUNITY
Start: 2023-08-28 | End: 2023-09-07

## 2023-09-07 RX ORDER — CLARITHROMYCIN 500 MG/1
500 TABLET, COATED ORAL EVERY 12 HOURS SCHEDULED
Qty: 28 TABLET | Refills: 0 | Status: SHIPPED | OUTPATIENT
Start: 2023-09-07 | End: 2023-09-21

## 2023-09-07 NOTE — PROGRESS NOTES
[facility]  Anuradha Berger 38 y.o. male MRN: [id number]  Encounter: [id number]      ASSESSMENT AND PLAN:    Anuradha Berger is a 38 y.o. old pleasant male with PMH of H. pylori infection who presents for hospital follow-up for abdominal pain and abnormal CAT scan    H. pylori infection-patient underwent EGD during hospitalization with biopsies positive for H. pylori. · We will prescribe triple therapy with repeat stool testing. Patient counseled to stop PPI for 2 weeks and weight 4 weeks after his regimen is completed before getting stool test.    Elevated fecal calprotectin, abnormal CAT scan-CAT scan during this hospitalization showed interval small bowel dilation with a long segment of small bowel mural thickening which could reflect IBD. His fecal calprotectin returned markedly elevated. These findings are suspicious for Crohn's. He has no diarrhea no bleeding no family history of IBD however his abdominal pain could be due to intermittent obstruction. · Check MR enterography for further evaluation  · Consider referral to IBD specialist based on results of MRI and    Epigastric pain-postprandial pain unclear if this is due to H. pylori or possible cause as above. He states his pain is significantly proved but still present after he started PPI in hospital.  · See plan above    Hepatitis C Screening - no previous testing in chart  • Check Hepatitis C serology     Hepatitis B Screening - no previous testing in chart  • Check hepatitis b serologies      1. Epigastric pain      2. H. pylori infection  - H. pylori antigen, stool; Future  - clarithromycin (BIAXIN) 500 mg tablet; Take 1 tablet (500 mg total) by mouth every 12 (twelve) hours for 14 days  Dispense: 28 tablet; Refill: 0  - amoxicillin (AMOXIL) 500 mg capsule; Take 2 capsules (1,000 mg total) by mouth every 12 (twelve) hours for 14 days  Dispense: 56 capsule;  Refill: 0  - pantoprazole (PROTONIX) 40 mg tablet; Take 1 tablet (40 mg total) by mouth 2 (two) times a day  Dispense: 28 tablet; Refill: 0    3. Elevated fecal calprotectin    - MRI enterography w wo; Future    ______________________________________________________________________    SUBJECTIVE:  Shanae Pepper is a 38 y.o. old pleasant male with PMH of H. pylori infection who presents for hospital follow-up for abdominal pain and abnormal CAT scan. Patient was recently hospitalized and seen by myself at hospital.  He presented with abdominal pain found to have CAT scan showing small bowel thickening in the proximal ileum. He underwent EGD and colonoscopy with findings as above. He was discharged with states his symptoms have improved. After discharge his H. pylori came back positive and his fecal calprotectin came back elevated. He states his symptoms have significantly improved. Stool appointment some postprandial abdominal pain      I reviewed prior external notes    I reviewed previous lab results and images      REVIEW OF SYSTEMS:     REVIEW OF ALL OTHER SYSTEMS IS OTHERWISE NEGATIVE. Historical Information   No past medical history on file. No past surgical history on file. Social History   Social History     Substance and Sexual Activity   Alcohol Use Never     Social History     Substance and Sexual Activity   Drug Use Never     Social History     Tobacco Use   Smoking Status Never   Smokeless Tobacco Never     No family history on file. Meds/Allergies       Current Outpatient Medications:   •  amoxicillin (AMOXIL) 500 mg capsule  •  clarithromycin (BIAXIN) 500 mg tablet  •  famotidine (PEPCID) 20 mg tablet  •  pantoprazole (PROTONIX) 40 mg tablet  •  dicyclomine (BENTYL) 10 mg capsule    No Known Allergies        Objective     Blood pressure 100/78, pulse 61, resp. rate 16, height 5' 6" (1.676 m), weight 75.9 kg (167 lb 6.4 oz), SpO2 97 %. Body mass index is 27.02 kg/m².       PHYSICAL EXAM:      General Appearance:   Alert, cooperative, no distress   HEENT:   Normocephalic, atraumatic, anicteric. Neck:  Supple, symmetrical, trachea midline   Lungs:   Clear to auscultation bilaterally; no rales, rhonchi or wheezing; respirations unlabored    Heart[de-identified]   Regular rate and rhythm; no murmur, rub, or gallop. Abdomen:   Soft, non-tender, non-distended; normal bowel sounds; no masses, no organomegaly    Genitalia:   Deferred    Rectal:   Deferred    Extremities:  No cyanosis, clubbing or edema    Pulses:  2+ and symmetric    Skin:  No jaundice, rashes, or lesions    Lymph nodes:  No palpable cervical lymphadenopathy        Lab Results:   No visits with results within 1 Day(s) from this visit.    Latest known visit with results is:   Admission on 08/29/2023, Discharged on 08/30/2023   Component Date Value   • Ventricular Rate 08/29/2023 62    • Atrial Rate 08/29/2023 62    • FL Interval 08/29/2023 164    • QRSD Interval 08/29/2023 100    • QT Interval 08/29/2023 414    • QTC Interval 08/29/2023 420    • P Axis 08/29/2023 41    • QRS Axis 08/29/2023 48    • T Wave Axis 08/29/2023 51    • WBC 08/29/2023 8.39    • RBC 08/29/2023 4.82    • Hemoglobin 08/29/2023 14.4    • Hematocrit 08/29/2023 42.4    • MCV 08/29/2023 88    • MCH 08/29/2023 29.9    • MCHC 08/29/2023 34.0    • RDW 08/29/2023 13.1    • MPV 08/29/2023 8.8 (L)    • Platelets 24/65/2889 190    • nRBC 08/29/2023 0    • Neutrophils Relative 08/29/2023 69    • Immat GRANS % 08/29/2023 0    • Lymphocytes Relative 08/29/2023 17    • Monocytes Relative 08/29/2023 12    • Eosinophils Relative 08/29/2023 2    • Basophils Relative 08/29/2023 0    • Neutrophils Absolute 08/29/2023 5.77    • Immature Grans Absolute 08/29/2023 0.01    • Lymphocytes Absolute 08/29/2023 1.41    • Monocytes Absolute 08/29/2023 1.00    • Eosinophils Absolute 08/29/2023 0.17    • Basophils Absolute 08/29/2023 0.03    • Sodium 08/29/2023 137    • Potassium 08/29/2023 4.0    • Chloride 08/29/2023 105    • CO2 08/29/2023 28    • ANION GAP 08/29/2023 4    • BUN 08/29/2023 18    • Creatinine 08/29/2023 0.87    • Glucose 08/29/2023 100    • Calcium 08/29/2023 9.2    • AST 08/29/2023 24    • ALT 08/29/2023 29    • Alkaline Phosphatase 08/29/2023 70    • Total Protein 08/29/2023 6.9    • Albumin 08/29/2023 4.1    • Total Bilirubin 08/29/2023 0.59    • eGFR 08/29/2023 104    • Lipase 08/29/2023 25    • LACTIC ACID 08/29/2023 0.7    • CRP 08/29/2023 49.6 (H)    • Calprotectin 08/29/2023 2250 (H)    • Sodium 08/30/2023 141    • Potassium 08/30/2023 4.5    • Chloride 08/30/2023 108    • CO2 08/30/2023 30    • ANION GAP 08/30/2023 3    • BUN 08/30/2023 12    • Creatinine 08/30/2023 0.85    • Glucose 08/30/2023 99    • Glucose, Fasting 08/30/2023 99    • Calcium 08/30/2023 9.3    • AST 08/30/2023 19    • ALT 08/30/2023 23    • Alkaline Phosphatase 08/30/2023 56    • Total Protein 08/30/2023 6.6    • Albumin 08/30/2023 4.0    • Total Bilirubin 08/30/2023 0.57    • eGFR 08/30/2023 105    • WBC 08/30/2023 4.12 (L)    • RBC 08/30/2023 4.67    • Hemoglobin 08/30/2023 13.5    • Hematocrit 08/30/2023 41.3    • MCV 08/30/2023 88    • MCH 08/30/2023 28.9    • MCHC 08/30/2023 32.7    • RDW 08/30/2023 13.1    • Platelets 12/68/6956 216    • MPV 08/30/2023 8.8 (L)    • Case Report 08/30/2023                      Value:Surgical Pathology Report                         Case: [id number]                                   Authorizing Provider:  Kasey Hernandez MD           Collected:           08/30/2023 1309              Ordering Location:     [address] [place] [address] Received:            08/30/2023 [address]                                                      Pathologist:           Darcy Herbert MD                                                             Specimens:   A) - Duodenum                                                                                       B) - Stomach C) - Terminal Ileum                                                                       • Final Diagnosis 08/30/2023                      Value: This result contains rich text formatting which cannot be displayed here. • Additional Information 08/30/2023                      Value: This result contains rich text formatting which cannot be displayed here. • Gross Description 08/30/2023                      Value: This result contains rich text formatting which cannot be displayed here. • Clinical Information 08/30/2023                      Value:- Unremarkable small bowel mucosa  Cold bx r/o H pylori         Radiology Results:   Colonoscopy    Result Date: 8/30/2023  Narrative: Table formatting from the original result was not included. [address] [place] [address] Endoscopy 2021 [facility] [place] [address] [phone] DATE OF SERVICE: 8/30/23 PHYSICIAN(S): Attending: Jermaine Whitehead MD Fellow: No Staff Documented INDICATION: Epigastric pain, Abnormal CT of the abdomen POST-OP DIAGNOSIS: See the impression below. HISTORY: Prior colonoscopy: No prior colonoscopy. BOWEL PREPARATION: Miralax/Dulcolax PREPROCEDURE: Informed consent was obtained for the procedure, including sedation. Risks including but not limited to bleeding, infection, perforation, adverse drug reaction and aspiration were explained in detail. Also explained about less than 100% sensitivity with the exam and other alternatives. The patient was placed in the left lateral decubitus position. Procedure: Colonoscopy DETAILS OF PROCEDURE: Patient was taken to the procedure room where a time out was performed to confirm correct patient and correct procedure.  The patient underwent monitored anesthesia care, which was administered by an anesthesia professional. The patient's blood pressure, heart rate, level of consciousness, oxygen, respirations and ECG were monitored throughout the procedure. A digital rectal exam was performed. The scope was introduced through the anus and advanced to the cecum. Retroflexion was performed in the rectum. The quality of bowel preparation was evaluated using the [facility] Bowel Preparation Scale with scores of: right colon = 2, transverse colon = 2, left colon = 2. The total BBPS score was 6. Bowel prep was adequate. The patient experienced no blood loss. The procedure was not difficult. The patient tolerated the procedure well. There were no apparent adverse events. ANESTHESIA INFORMATION: ASA: II Anesthesia Type: IV Sedation with Anesthesia MEDICATIONS: No administrations occurring from 1305 to 1325 on 08/30/23 FINDINGS: Performed forceps biopsies in the terminal ileum The terminal ileum and entire colon appeared normal. EVENTS: Procedure Events Event Event Time ENDO CECUM REACHED 8/30/2023  1:18 PM ENDO SCOPE OUT TIME 8/30/2023  1:25 PM SPECIMENS: ID Type Source Tests Collected by Time Destination 1 :  Tissue Duodenum TISSUE EXAM Jerome Arnold MD 8/30/2023  1:11 PM  2 :  Tissue Stomach TISSUE EXAM Jerome Arnold MD 8/30/2023  1:09 PM  3 :  Tissue Terminal Ileum TISSUE EXAM Jerome Nina MD 8/30/2023  1:22 PM  EQUIPMENT: Colonoscope -[id number]     Impression: The terminal ileum was intubated roughly 40 cm. It appeared normal.  Biopsies were taken of the terminal ileum to rule out Crohn's. The rest of the colon appeared normal.  This exam was not adequate for colon cancer screening RECOMMENDATION:  Repeat colonoscopy in 5 years  Colon cancer screening    Kasey Hernandez MD     EGD    Result Date: 8/30/2023  Narrative: Table formatting from the original result was not included.   [address] [place] [address] Endoscopy 2021 [place] [address] [phone] DATE OF SERVICE: 8/30/23 PHYSICIAN(S): Attending: Kasey Hernandez MD Fellow: No Staff Documented INDICATION: Epigastric pain, Abnormal CT of the abdomen POST-OP DIAGNOSIS: See the impression below. PREPROCEDURE: Informed consent was obtained for the procedure, including sedation. Risks of perforation, hemorrhage, adverse drug reaction and aspiration were discussed. The patient was placed in the left lateral decubitus position. Patient was explained about the risks and benefits of the procedure. Risks including but not limited to bleeding, infection, and perforation were explained in detail. Also explained about less than 100% sensitivity with the exam and other alternatives. PROCEDURE: EGD DETAILS OF PROCEDURE: Patient was taken to the procedure room where a time out was performed to confirm correct patient and correct procedure. The patient underwent monitored anesthesia care, which was administered by an anesthesia professional. The patient's blood pressure, heart rate, level of consciousness, respirations and oxygen were monitored throughout the procedure. The scope was advanced to the second part of the duodenum. Retroflexion was performed in the fundus. The patient experienced no blood loss. The procedure was not difficult. The patient tolerated the procedure well. There were no apparent adverse events. ANESTHESIA INFORMATION: ASA: II Anesthesia Type: IV Sedation with Anesthesia MEDICATIONS: No administrations occurring from 1305 to 1313 on 08/30/23 FINDINGS: The esophagus appeared normal. Mild edematous and erythematous mucosa in the antrum Performed multiple forceps biopsies in the body of the stomach, incisura and antrum. Rule out H. Pylori. The 1st part of the duodenum and 2nd part of the duodenum appeared normal. Performed multiple forceps biopsies in the 1st part of the duodenum and 2nd part of the duodenum. Rule out Celiac disease.  SPECIMENS: ID Type Source Tests Collected by Time Destination 1 :  Tissue Duodenum TISSUE EXAM Stuart Mcdaniel MD 8/30/2023  1:11 PM  2 :  Tissue Stomach TISSUE EXAM Jerome Nina MD 8/30/2023  1:09 PM      Impression: The esophagus appeared normal. Mild edematous and erythematous mucosa in the antrum Performed forceps biopsies in the body of the stomach, incisura and antrum The 1st part of the duodenum and 2nd part of the duodenum appeared normal. Performed forceps biopsies in the 1st part of the duodenum and 2nd part of the duodenum RECOMMENDATION:  Await pathology results   Delroy Frias MD     US right upper quadrant    Result Date: 8/29/2023  Narrative: RIGHT UPPER QUADRANT ULTRASOUND INDICATION:     ruq pain. COMPARISON: CT same day. TECHNIQUE:   Real-time ultrasound of the right upper quadrant was performed with a curvilinear transducer with both volumetric sweeps and still imaging techniques. FINDINGS: PANCREAS:  Visualized portions of the pancreas are within normal limits. AORTA AND IVC:  Visualized portions are normal for patient age. LIVER: Size:  Within normal range. The liver measures 15.3 cm in the midclavicular line. Contour:  Surface contour is smooth. Parenchyma:  Echogenicity and echotexture are within normal limits. No liver mass identified. Limited imaging of the main portal vein shows it to be patent and hepatopetal. BILIARY: No gallbladder findings. No intrahepatic biliary dilatation. CBD measures 4.0 mm. No choledocholithiasis. KIDNEY: Right kidney measures 10.7 x 4.5 x 4.7 cm. Volume 117.8 mL Kidney within normal limits. ASCITES:   None. Impression: Normal. Workstation performed: [id number]     CT abdomen pelvis with contrast    Result Date: 8/29/2023  Narrative: CT ABDOMEN AND PELVIS WITH IV CONTRAST INDICATION:   Epigastric pain epigastric pain. COMPARISON: 8/28/2023 TECHNIQUE:  CT examination of the abdomen and pelvis was performed. Multiplanar 2D reformatted images were created from the source data. This examination, like all CT scans performed in the [facility], was performed utilizing techniques to minimize radiation dose exposure, including the use of iterative reconstruction and automated exposure control. Radiation dose length product (DLP) for this visit:  734 mGy-cm IV Contrast:  100 mL of iohexol (OMNIPAQUE) Enteric Contrast:  Enteric contrast was not administered. FINDINGS: ABDOMEN LOWER CHEST:  No clinically significant abnormality identified in the visualized lower chest. LIVER/BILIARY TREE:  Unremarkable. GALLBLADDER:  No calcified gallstones. No pericholecystic inflammatory change. SPLEEN:  Unremarkable. PANCREAS:  Unremarkable. ADRENAL GLANDS:  Unremarkable. KIDNEYS/URETERS:  Unremarkable. No hydronephrosis. STOMACH AND BOWEL: Interval development of small bowel dilatation is identified with fecal like material present. There is a long segment of small bowel mural thickening and enhancement within the mid right abdomen suggesting potential inflammatory bowel  disease. APPENDIX:  No findings to suggest appendicitis. ABDOMINOPELVIC CAVITY:  No ascites. No pneumoperitoneum. No lymphadenopathy. VESSELS:  Unremarkable for patient's age. PELVIS REPRODUCTIVE ORGANS:  Unremarkable for patient's age. URINARY BLADDER:  Unremarkable. ABDOMINAL WALL/INGUINAL REGIONS:  Unremarkable. OSSEOUS STRUCTURES:  No acute fracture or destructive osseous lesion. Impression: Interval small bowel dilatation with long segment of small bowel mural thickening and enhancement potentially reflecting inflammatory bowel disease. Workstation performed: [id number]     CT abdomen pelvis with contrast    Result Date: 8/28/2023  Narrative: CT ABDOMEN AND PELVIS WITH IV CONTRAST INDICATION:   epigastric pain. COMPARISON:  None. TECHNIQUE:  CT examination of the abdomen and pelvis was performed. Multiplanar 2D reformatted images were created from the source data. This examination, like all CT scans performed in the [facility], was performed utilizing techniques to minimize radiation dose exposure, including the use of iterative reconstruction and automated exposure control.  Radiation dose length product (DLP) for this visit:  672 mGy-cm IV Contrast:  100 mL of iohexol (OMNIPAQUE) Enteric Contrast:  Enteric contrast was not administered. FINDINGS: ABDOMEN LOWER CHEST:  No clinically significant abnormality identified in the visualized lower chest. LIVER/BILIARY TREE:  Unremarkable. GALLBLADDER:  No calcified gallstones. No pericholecystic inflammatory change. SPLEEN:  Unremarkable. PANCREAS:  Unremarkable. ADRENAL GLANDS:  Unremarkable. KIDNEYS/URETERS:  Unremarkable. No hydronephrosis. STOMACH AND BOWEL:  Unremarkable. APPENDIX:  No findings to suggest appendicitis. ABDOMINOPELVIC CAVITY:  No ascites. No pneumoperitoneum. No lymphadenopathy. VESSELS:  Unremarkable for patient's age. PELVIS REPRODUCTIVE ORGANS:  Unremarkable for patient's age. URINARY BLADDER:  Unremarkable. ABDOMINAL WALL/INGUINAL REGIONS:  Unremarkable. OSSEOUS STRUCTURES:  No acute fracture or destructive osseous lesion.      Impression: No evidence of acute intra-abdominal or pelvic pathology Workstation performed: [id number]

## 2023-09-25 ENCOUNTER — HOSPITAL ENCOUNTER (OUTPATIENT)
Dept: RADIOLOGY | Facility: HOSPITAL | Age: 43
Discharge: HOME | End: 2023-09-25
Payer: COMMERCIAL

## 2023-09-25 ENCOUNTER — HOSPITAL ENCOUNTER (OUTPATIENT)
Dept: MRI IMAGING | Facility: HOSPITAL | Age: 43
Discharge: HOME | End: 2023-09-25
Attending: INTERNAL MEDICINE
Payer: COMMERCIAL

## 2023-09-25 ENCOUNTER — APPOINTMENT (OUTPATIENT)
Dept: RADIOLOGY | Facility: HOSPITAL | Age: 43
End: 2023-09-25
Payer: COMMERCIAL

## 2023-09-25 DIAGNOSIS — R19.5 ELEVATED FECAL CALPROTECTIN: ICD-10-CM

## 2023-09-25 PROCEDURE — 74183 MRI ABD W/O CNTR FLWD CNTR: CPT

## 2023-09-25 PROCEDURE — G1004 CDSM NDSC: HCPCS

## 2023-09-25 PROCEDURE — A9585 GADOBUTROL INJECTION: HCPCS | Performed by: INTERNAL MEDICINE

## 2023-09-25 PROCEDURE — 72197 MRI PELVIS W/O & W/DYE: CPT

## 2023-09-25 RX ORDER — GADOBUTROL 604.72 MG/ML
7 INJECTION INTRAVENOUS
Status: COMPLETED | OUTPATIENT
Start: 2023-09-25 | End: 2023-09-25

## 2023-09-25 RX ADMIN — GADOBUTROL 7 ML: 604.72 INJECTION INTRAVENOUS at 09:21

## 2023-09-29 ENCOUNTER — APPOINTMENT (OUTPATIENT)
Dept: LAB | Facility: HOSPITAL | Age: 43
End: 2023-09-29
Payer: COMMERCIAL

## 2023-09-29 DIAGNOSIS — Z11.59 NEED FOR HEPATITIS B SCREENING TEST: ICD-10-CM

## 2023-09-29 DIAGNOSIS — A04.8 H. PYLORI INFECTION: ICD-10-CM

## 2023-09-29 DIAGNOSIS — Z11.59 ENCOUNTER FOR HEPATITIS C SCREENING TEST FOR LOW RISK PATIENT: ICD-10-CM

## 2023-09-29 LAB
HBV CORE AB SER QL: NORMAL
HBV SURFACE AB SER-ACNC: <3 MIU/ML
HBV SURFACE AG SER QL: NORMAL
HCV AB SER QL: NORMAL

## 2023-09-29 PROCEDURE — 87340 HEPATITIS B SURFACE AG IA: CPT

## 2023-09-29 PROCEDURE — 86803 HEPATITIS C AB TEST: CPT

## 2023-09-29 PROCEDURE — 86706 HEP B SURFACE ANTIBODY: CPT

## 2023-09-29 PROCEDURE — 36415 COLL VENOUS BLD VENIPUNCTURE: CPT

## 2023-09-29 PROCEDURE — 86704 HEP B CORE ANTIBODY TOTAL: CPT

## 2023-10-03 ENCOUNTER — APPOINTMENT (OUTPATIENT)
Dept: LAB | Facility: HOSPITAL | Age: 43
End: 2023-10-03
Payer: COMMERCIAL

## 2023-10-03 PROCEDURE — 87338 HPYLORI STOOL AG IA: CPT

## 2023-10-04 LAB — H PYLORI AG STL QL IA: NEGATIVE

## 2023-10-05 ENCOUNTER — TELEPHONE (OUTPATIENT)
Age: 43
End: 2023-10-05

## 2023-10-05 NOTE — TELEPHONE ENCOUNTER
----- Message from Srikanth Allen MA sent at 10/4/2023  1:09 PM EDT -----  Can you please speak with the pt? He speaks [id number] [employer] [place]  [place].   ----- Message -----  From: Angela Boyd MD  Sent: 10/4/2023   6:25 AM EDT  To: [facility]    Can we please call patient and let him know that his MRI was negative for any signs of Crohns disease and his previous inflammation has resolved. I suspect his inflammation could be due to transient infection which appears to have resolved. Thank you.

## 2025-03-03 ENCOUNTER — HOSPITAL ENCOUNTER (EMERGENCY)
Facility: HOSPITAL | Age: 45
Discharge: HOME | End: 2025-03-03
Attending: EMERGENCY MEDICINE

## 2025-03-03 VITALS
WEIGHT: 170 LBS | TEMPERATURE: 97.7 F | RESPIRATION RATE: 18 BRPM | HEIGHT: 66 IN | HEART RATE: 91 BPM | BODY MASS INDEX: 27.32 KG/M2 | DIASTOLIC BLOOD PRESSURE: 75 MMHG | OXYGEN SATURATION: 97 % | SYSTOLIC BLOOD PRESSURE: 126 MMHG

## 2025-03-03 DIAGNOSIS — J11.1 INFLUENZA: Primary | ICD-10-CM

## 2025-03-03 LAB
FLUAV RNA RESP QL NAA+PROBE: POSITIVE
FLUBV RNA RESP QL NAA+PROBE: NEGATIVE
RSV RNA RESP QL NAA+PROBE: NEGATIVE
SARS-COV-2 RNA RESP QL NAA+PROBE: NEGATIVE

## 2025-03-03 PROCEDURE — 99283 EMERGENCY DEPT VISIT LOW MDM: CPT

## 2025-03-03 PROCEDURE — 99284 EMERGENCY DEPT VISIT MOD MDM: CPT | Performed by: EMERGENCY MEDICINE

## 2025-03-03 PROCEDURE — 96372 THER/PROPH/DIAG INJ SC/IM: CPT

## 2025-03-03 PROCEDURE — 0241U HB NFCT DS VIR RESP RNA 4 TRGT: CPT

## 2025-03-03 RX ORDER — KETOROLAC TROMETHAMINE 30 MG/ML
15 INJECTION, SOLUTION INTRAMUSCULAR; INTRAVENOUS ONCE
Status: COMPLETED | OUTPATIENT
Start: 2025-03-03 | End: 2025-03-03

## 2025-03-03 RX ADMIN — KETOROLAC TROMETHAMINE 15 MG: 30 INJECTION, SOLUTION INTRAMUSCULAR; INTRAVENOUS at 22:06

## 2025-03-04 NOTE — ED PROVIDER NOTES
Time reflects when diagnosis was documented in both MDM as applicable and the Disposition within this note       Time User Action Codes Description Comment    3/3/2025  9:53 PM eLann Kellogg Add [J11.1] Influenza           ED Disposition       ED Disposition   Discharge    Condition   Stable    Date/Time   Mon Mar 3, 2025  9:53 PM    Comment   Nickolas Stringer discharge to home/self care.                   Assessment & Plan       Medical Decision Making  44-year-old male with flulike symptoms.  Will get COVID/flu/RSV testing and reassess.    Patient positive for flu.  Will give Toradol and discharged home.  Explained risks versus benefits of Tamiflu and patient does not want this medication at this time.  Explained supportive care at home.    Risk  Prescription drug management.             Medications   ketorolac (TORADOL) injection 15 mg (has no administration in time range)       ED Risk Strat Scores                            SBIRT 22yo+      Flowsheet Row Most Recent Value   Initial Alcohol Screen: US AUDIT-C     1. How often do you have a drink containing alcohol? 0 Filed at: 03/03/2025 2041   2. How many drinks containing alcohol do you have on a typical day you are drinking?  0 Filed at: 03/03/2025 2041   3a. Male UNDER 65: How often do you have five or more drinks on one occasion? 0 Filed at: 03/03/2025 2041   Audit-C Score 0 Filed at: 03/03/2025 2041   GAIL: How many times in the past year have you...    Used an illegal drug or used a prescription medication for non-medical reasons? Never Filed at: 03/03/2025 2041                            History of Present Illness       Chief Complaint   Patient presents with    Flu Symptoms     Body aches and headache started today        No past medical history on file.   No past surgical history on file.   No family history on file.   Social History     Tobacco Use    Smoking status: Never    Smokeless tobacco: Never   Vaping Use    Vaping status: Never Used    Substance Use Topics    Alcohol use: Never    Drug use: Never      E-Cigarette/Vaping    E-Cigarette Use Never User       E-Cigarette/Vaping Substances      I have reviewed and agree with the history as documented.     43 y/o male presnets to the ED for flu like symptoms since this morning. Patient states that he has had headache, subjective fever, body aches, chills, cough, sore throat, and congestion since this morning.  Denies any known sick contacts.  Has not tried anything for the symptoms.  Denies any nausea/vomiting/diarrhea or abdominal pain.  No other complaints.      History provided by:  Patient  Flu Symptoms  Presenting symptoms: cough, fever, myalgias and sore throat    Presenting symptoms: no diarrhea, no headaches, no nausea, no shortness of breath and no vomiting    Severity:  Moderate  Onset quality:  Sudden  Progression:  Worsening  Chronicity:  New  Relieved by:  None tried  Worsened by:  Nothing  Ineffective treatments:  None tried  Associated symptoms: chills and nasal congestion    Associated symptoms: no ear pain and no neck stiffness    Risk factors: sick contacts        Review of Systems   Constitutional:  Positive for chills and fever.   HENT:  Positive for congestion and sore throat. Negative for ear pain.    Eyes:  Negative for pain and visual disturbance.   Respiratory:  Positive for cough. Negative for shortness of breath and wheezing.    Cardiovascular:  Negative for chest pain and leg swelling.   Gastrointestinal:  Negative for abdominal pain, diarrhea, nausea and vomiting.   Genitourinary:  Negative for dysuria, frequency, hematuria and urgency.   Musculoskeletal:  Positive for myalgias. Negative for neck pain and neck stiffness.   Skin:  Negative for rash and wound.   Neurological:  Negative for weakness, numbness and headaches.   Psychiatric/Behavioral:  Negative for agitation and confusion.    All other systems reviewed and are negative.          Objective       ED Triage Vitals  [03/03/25 2039]   Temperature Pulse Blood Pressure Respirations SpO2 Patient Position - Orthostatic VS   97.7 °F (36.5 °C) 91 126/75 18 97 % Sitting      Temp Source Heart Rate Source BP Location FiO2 (%) Pain Score    Temporal Monitor Left arm -- --      Vitals      Date and Time Temp Pulse SpO2 Resp BP Pain Score FACES Pain Rating User   03/03/25 2039 97.7 °F (36.5 °C) 91 97 % 18 126/75 -- -- RO            Physical Exam  Vitals and nursing note reviewed.   Constitutional:       Appearance: He is well-developed.   HENT:      Head: Normocephalic and atraumatic.   Eyes:      Pupils: Pupils are equal, round, and reactive to light.   Cardiovascular:      Rate and Rhythm: Normal rate and regular rhythm.   Pulmonary:      Effort: Pulmonary effort is normal.      Breath sounds: Normal breath sounds.   Abdominal:      General: Bowel sounds are normal.      Palpations: Abdomen is soft.   Musculoskeletal:         General: Normal range of motion.      Cervical back: Normal range of motion and neck supple.   Skin:     General: Skin is warm and dry.   Neurological:      General: No focal deficit present.      Mental Status: He is alert and oriented to person, place, and time.      Comments: No focal deficits         Results Reviewed       Procedure Component Value Units Date/Time    FLU/RSV/COVID - if FLU/RSV clinically relevant (2hr TAT) [[id number]]  (Abnormal) Collected: 03/03/25 2042    Lab Status: Final result Specimen: Nares from Nose Updated: 03/03/25 2129     SARS-CoV-2 Negative     INFLUENZA A PCR Positive     INFLUENZA B PCR Negative     RSV PCR Negative    Narrative:      This test has been performed using the CoV-2/Flu/RSV plus assay on the [employer] GeneXpert platform. This test has been validated by the  and verified by the performing laboratory.     This test is designed to amplify and detect the following: nucleocapsid (N), envelope (E), and RNA-dependent RNA polymerase (RdRP) genes of the SARS-CoV-2  genome; matrix (M), basic polymerase (PB2), and acidic protein (PA) segments of the influenza A genome; matrix (M) and non-structural protein (NS) segments of the influenza B genome, and the nucleocapsid genes of RSV A and RSV B.     Positive results are indicative of the presence of Flu A, Flu B, RSV, and/or SARS-CoV-2 RNA. Positive results for SARS-CoV-2 or suspected novel influenza should be reported to state, local, or federal health departments according to local reporting requirements.      All results should be assessed in conjunction with clinical presentation and other laboratory markers for clinical management.     FOR PEDIATRIC PATIENTS - copy/paste COVID Guidelines URL to browser: [email or web address]               No orders to display       Procedures    ED Medication and Procedure Management   Prior to Admission Medications   Prescriptions Last Dose Informant Patient Reported? Taking?   dicyclomine (BENTYL) 10 mg capsule   No No   Sig: Take 1 capsule (10 mg total) by mouth 4 (four) times a day (before meals and at bedtime) for 10 doses   famotidine (PEPCID) 20 mg tablet  Self No No   Sig: Take 1 tablet (20 mg total) by mouth 2 (two) times a day   pantoprazole (PROTONIX) 40 mg tablet   No No   Sig: Take 1 tablet (40 mg total) by mouth 2 (two) times a day      Facility-Administered Medications: None     Patient's Medications   Discharge Prescriptions    No medications on file     No discharge procedures on file.  ED SEPSIS DOCUMENTATION   Time reflects when diagnosis was documented in both MDM as applicable and the Disposition within this note       Time User Action Codes Description Comment    3/3/2025  9:53 PM Leann Kellogg Add [J11.1] Influenza                  Leann Kellogg DO  03/03/25 2202

## 2025-06-24 ENCOUNTER — HOSPITAL ENCOUNTER (EMERGENCY)
Facility: HOSPITAL | Age: 45
Discharge: HOME | End: 2025-06-24
Attending: EMERGENCY MEDICINE | Admitting: EMERGENCY MEDICINE
Payer: COMMERCIAL

## 2025-06-24 VITALS
OXYGEN SATURATION: 95 % | SYSTOLIC BLOOD PRESSURE: 119 MMHG | RESPIRATION RATE: 16 BRPM | TEMPERATURE: 98.2 F | DIASTOLIC BLOOD PRESSURE: 88 MMHG | HEART RATE: 70 BPM

## 2025-06-24 DIAGNOSIS — M54.32 SCIATICA OF LEFT SIDE: Primary | ICD-10-CM

## 2025-06-24 PROCEDURE — 96372 THER/PROPH/DIAG INJ SC/IM: CPT

## 2025-06-24 PROCEDURE — 99284 EMERGENCY DEPT VISIT MOD MDM: CPT | Performed by: EMERGENCY MEDICINE

## 2025-06-24 PROCEDURE — 99282 EMERGENCY DEPT VISIT SF MDM: CPT

## 2025-06-24 RX ORDER — KETOROLAC TROMETHAMINE 30 MG/ML
30 INJECTION, SOLUTION INTRAMUSCULAR; INTRAVENOUS ONCE
Status: COMPLETED | OUTPATIENT
Start: 2025-06-24 | End: 2025-06-24

## 2025-06-24 RX ORDER — LIDOCAINE 50 MG/G
1 PATCH TOPICAL ONCE
Status: DISCONTINUED | OUTPATIENT
Start: 2025-06-24 | End: 2025-06-24 | Stop reason: HOSPADM

## 2025-06-24 RX ORDER — IBUPROFEN 600 MG/1
600 TABLET, FILM COATED ORAL EVERY 8 HOURS PRN
Qty: 30 TABLET | Refills: 0 | Status: SHIPPED | OUTPATIENT
Start: 2025-06-24

## 2025-06-24 RX ORDER — LIDOCAINE 50 MG/G
1 PATCH TOPICAL DAILY PRN
Qty: 30 PATCH | Refills: 0 | Status: SHIPPED | OUTPATIENT
Start: 2025-06-24

## 2025-06-24 RX ADMIN — KETOROLAC TROMETHAMINE 30 MG: 30 INJECTION, SOLUTION INTRAMUSCULAR at 04:44

## 2025-06-24 RX ADMIN — LIDOCAINE 5% 1 PATCH: 700 PATCH TOPICAL at 04:44

## 2025-06-24 NOTE — ED PROVIDER NOTES
Time reflects when diagnosis was documented in both MDM as applicable and the Disposition within this note       Time User Action Codes Description Comment    6/24/2025  4:41 AM Socorro Adler Add [M54.32] Sciatica of left side           ED Disposition       ED Disposition   Discharge    Condition   Stable    Date/Time   Tue Jun 24, 2025  4:41 AM    Comment   Nickolas Stringer discharge to home/self care.                   Assessment & Plan       Medical Decision Making  Patient is a 44-year-old male who presents to the emergency department with worsening left-sided back and leg pain.  Pattern of pain is consistent with sciatica.  Exam is notable for left paraspinal muscle tenderness as well as left-sided straight leg raise.  No midline or bony tenderness.  No traumatic injuries.  He states that he began having intermittent episodes of this pain since December after pushing a heavy cart.  He is employed as a  and so is frequently lifting, twisting, and doing significant manual labor.  No red flag signs or symptoms such as incontinence of stool or urine.  No saddle anesthesia.  Plan to treat symptomatically.  Discussed referral to comprehensive spine.  Patient is agreeable.    Risk  Prescription drug management.             Medications   ketorolac (TORADOL) injection 30 mg (has no administration in time range)   lidocaine (LIDODERM) 5 % patch 1 patch (has no administration in time range)       ED Risk Strat Scores                    No data recorded                            History of Present Illness       Chief Complaint   Patient presents with    Back Pain     Pt coming with back pain that radiates down the leg been a problem since December but tonight worsened couldn't fall asleep.       Past Medical History[1]   Past Surgical History[2]   Family History[3]   Social History[4]   E-Cigarette/Vaping    E-Cigarette Use Never User       E-Cigarette/Vaping Substances      I have reviewed and agree with the  history as documented.     Presents with worsening left lower back pain.          Review of Systems   Musculoskeletal:  Positive for back pain.           Objective       ED Triage Vitals [06/24/25 0408]   Temperature Pulse Blood Pressure Respirations SpO2 Patient Position - Orthostatic VS   98.2 °F (36.8 °C) 60 134/84 17 95 % Sitting      Temp Source Heart Rate Source BP Location FiO2 (%) Pain Score    Oral Monitor Right arm -- 7      Vitals      Date and Time Temp Pulse SpO2 Resp BP Pain Score FACES Pain Rating User   06/24/25 0430 -- 70 95 % 16 119/88 -- -- KL   06/24/25 0408 98.2 °F (36.8 °C) 60 95 % 17 134/84 7 -- KL            Physical Exam  Vitals and nursing note reviewed.   Constitutional:       General: He is not in acute distress.     Appearance: Normal appearance.   HENT:      Head: Normocephalic and atraumatic.      Right Ear: External ear normal.      Left Ear: External ear normal.     Cardiovascular:      Rate and Rhythm: Normal rate.   Pulmonary:      Effort: Pulmonary effort is normal. No respiratory distress.     Musculoskeletal:      Lumbar back: Spasms and tenderness present. Positive left straight leg raise test. Negative right straight leg raise test.      Right lower leg: No edema.      Left lower leg: No edema.     Skin:     General: Skin is warm and dry.     Neurological:      General: No focal deficit present.      Mental Status: He is alert and oriented to person, place, and time. Mental status is at baseline.      Sensory: No sensory deficit.      Motor: No weakness.     Psychiatric:         Behavior: Behavior normal.         Results Reviewed       None            No orders to display       Procedures    ED Medication and Procedure Management   Prior to Admission Medications   Prescriptions Last Dose Informant Patient Reported? Taking?   dicyclomine (BENTYL) 10 mg capsule   No No   Sig: Take 1 capsule (10 mg total) by mouth 4 (four) times a day (before meals and at bedtime) for 10 doses    famotidine (PEPCID) 20 mg tablet  Self No No   Sig: Take 1 tablet (20 mg total) by mouth 2 (two) times a day   pantoprazole (PROTONIX) 40 mg tablet   No No   Sig: Take 1 tablet (40 mg total) by mouth 2 (two) times a day      Facility-Administered Medications: None     Patient's Medications   Discharge Prescriptions    IBUPROFEN (MOTRIN) 600 MG TABLET    Take 1 tablet (600 mg total) by mouth every 8 (eight) hours as needed for moderate pain       Start Date: 6/24/2025 End Date: --       Order Dose: 600 mg       Quantity: 30 tablet    Refills: 0    LIDOCAINE (LIDODERM) 5 %    Apply 1 patch topically daily as needed over 12 hours (left lower back) Remove & Discard patch within 12 hours or as directed by MD       Start Date: 6/24/2025 End Date: --       Order Dose: 1 patch       Quantity: 30 patch    Refills: 0    TIZANIDINE (ZANAFLEX) 4 MG TABLET    Take 1 tablet (4 mg total) by mouth every 8 (eight) hours as needed for muscle spasms       Start Date: 6/24/2025 End Date: --       Order Dose: 4 mg       Quantity: 20 tablet    Refills: 0       ED SEPSIS DOCUMENTATION   Time reflects when diagnosis was documented in both MDM as applicable and the Disposition within this note       Time User Action Codes Description Comment    6/24/2025  4:41 AM Socorro Adler Add [M54.32] Sciatica of left side                      [1] No past medical history on file.  [2] No past surgical history on file.  [3] No family history on file.  [4]   Social History  Tobacco Use    Smoking status: Never    Smokeless tobacco: Never   Vaping Use    Vaping status: Never Used   Substance Use Topics    Alcohol use: Never    Drug use: Never        Socorro Adler MD  06/24/25 8584

## 2025-06-25 ENCOUNTER — NURSE TRIAGE (OUTPATIENT)
Dept: PHYSICAL THERAPY | Facility: OTHER | Age: 45
End: 2025-06-25

## 2025-06-25 DIAGNOSIS — G89.29 ACUTE EXACERBATION OF CHRONIC LOW BACK PAIN: Primary | ICD-10-CM

## 2025-06-25 DIAGNOSIS — M54.50 ACUTE EXACERBATION OF CHRONIC LOW BACK PAIN: Primary | ICD-10-CM

## 2025-06-25 NOTE — TELEPHONE ENCOUNTER
Red Flag and Start Back documentation is currently located under Additional Charting.    [facility] was reviewed in detail and what we can provide for their back pain.  Patient is agreeable to being triaged and would like to proceed with Physical Therapy.    Referral was placed for Physical Therapy at the [place] site. Patients information was sent to the  to make evaluation appointment. Patient made aware that the PT office  will be calling to schedule the appointment.  Patient was provided with the phone number to the PT office.    No further questions and/or concerns were voiced by the patient at this time. Patient states understanding of the referral that was placed.    Referral Closed.
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
